# Patient Record
Sex: FEMALE | Race: BLACK OR AFRICAN AMERICAN
[De-identification: names, ages, dates, MRNs, and addresses within clinical notes are randomized per-mention and may not be internally consistent; named-entity substitution may affect disease eponyms.]

---

## 2024-07-09 ENCOUNTER — APPOINTMENT (OUTPATIENT)
Dept: UROGYNECOLOGY | Facility: CLINIC | Age: 39
End: 2024-07-09
Payer: COMMERCIAL

## 2024-07-09 VITALS
HEIGHT: 67 IN | OXYGEN SATURATION: 100 % | HEART RATE: 86 BPM | BODY MASS INDEX: 32.96 KG/M2 | TEMPERATURE: 98 F | SYSTOLIC BLOOD PRESSURE: 136 MMHG | WEIGHT: 210 LBS | DIASTOLIC BLOOD PRESSURE: 86 MMHG

## 2024-07-09 DIAGNOSIS — R35.1 NOCTURIA: ICD-10-CM

## 2024-07-09 DIAGNOSIS — R39.191 NEED TO IMMEDIATELY RE-VOID: ICD-10-CM

## 2024-07-09 DIAGNOSIS — K59.00 CONSTIPATION, UNSPECIFIED: ICD-10-CM

## 2024-07-09 DIAGNOSIS — R39.15 URGENCY OF URINATION: ICD-10-CM

## 2024-07-09 PROCEDURE — 99459 PELVIC EXAMINATION: CPT

## 2024-07-09 PROCEDURE — 51701 INSERT BLADDER CATHETER: CPT

## 2024-07-09 PROCEDURE — 99204 OFFICE O/P NEW MOD 45 MIN: CPT | Mod: 25

## 2024-07-11 LAB — BACTERIA UR CULT: NORMAL

## 2024-12-13 DIAGNOSIS — Z78.9 OTHER SPECIFIED HEALTH STATUS: ICD-10-CM

## 2024-12-13 DIAGNOSIS — Z87.59 PERSONAL HISTORY OF OTHER COMPLICATIONS OF PREGNANCY, CHILDBIRTH AND THE PUERPERIUM: ICD-10-CM

## 2024-12-13 DIAGNOSIS — Z3A.10 10 WEEKS GESTATION OF PREGNANCY: ICD-10-CM

## 2024-12-13 RX ORDER — LABETALOL HYDROCHLORIDE 200 MG/1
200 TABLET, FILM COATED ORAL 3 TIMES DAILY
Qty: 90 | Refills: 5 | Status: ACTIVE | COMMUNITY

## 2024-12-13 RX ORDER — PNV NO.95/FERROUS FUM/FOLIC AC 28MG-0.8MG
TABLET ORAL
Refills: 0 | Status: ACTIVE | COMMUNITY

## 2024-12-19 ENCOUNTER — NON-APPOINTMENT (OUTPATIENT)
Age: 39
End: 2024-12-19

## 2024-12-19 ENCOUNTER — APPOINTMENT (OUTPATIENT)
Dept: CARDIOLOGY | Facility: CLINIC | Age: 39
End: 2024-12-19
Payer: COMMERCIAL

## 2024-12-19 VITALS
OXYGEN SATURATION: 98 % | DIASTOLIC BLOOD PRESSURE: 71 MMHG | SYSTOLIC BLOOD PRESSURE: 117 MMHG | BODY MASS INDEX: 33.27 KG/M2 | TEMPERATURE: 97.5 F | HEART RATE: 92 BPM | HEIGHT: 67 IN | WEIGHT: 212 LBS

## 2024-12-19 DIAGNOSIS — I10 ESSENTIAL (PRIMARY) HYPERTENSION: ICD-10-CM

## 2024-12-19 PROCEDURE — 99204 OFFICE O/P NEW MOD 45 MIN: CPT | Mod: 25

## 2024-12-19 PROCEDURE — 93000 ELECTROCARDIOGRAM COMPLETE: CPT

## 2024-12-20 ENCOUNTER — ASOB RESULT (OUTPATIENT)
Age: 39
End: 2024-12-20

## 2024-12-20 ENCOUNTER — APPOINTMENT (OUTPATIENT)
Dept: ANTEPARTUM | Facility: CLINIC | Age: 39
End: 2024-12-20

## 2024-12-20 PROCEDURE — ZZZZZ: CPT

## 2024-12-20 PROCEDURE — 76801 OB US < 14 WKS SINGLE FETUS: CPT

## 2024-12-20 PROCEDURE — 76813 OB US NUCHAL MEAS 1 GEST: CPT

## 2024-12-27 ENCOUNTER — APPOINTMENT (OUTPATIENT)
Dept: MATERNAL FETAL MEDICINE | Facility: CLINIC | Age: 39
End: 2024-12-27

## 2024-12-27 ENCOUNTER — ASOB RESULT (OUTPATIENT)
Age: 39
End: 2024-12-27

## 2024-12-27 PROCEDURE — 99204 OFFICE O/P NEW MOD 45 MIN: CPT | Mod: 95

## 2024-12-31 ENCOUNTER — OUTPATIENT (OUTPATIENT)
Dept: OUTPATIENT SERVICES | Facility: HOSPITAL | Age: 39
LOS: 1 days | End: 2024-12-31

## 2024-12-31 VITALS
TEMPERATURE: 98 F | SYSTOLIC BLOOD PRESSURE: 128 MMHG | RESPIRATION RATE: 16 BRPM | DIASTOLIC BLOOD PRESSURE: 81 MMHG | OXYGEN SATURATION: 98 % | WEIGHT: 210.98 LBS | HEIGHT: 67 IN | HEART RATE: 85 BPM

## 2024-12-31 DIAGNOSIS — Z98.890 OTHER SPECIFIED POSTPROCEDURAL STATES: Chronic | ICD-10-CM

## 2024-12-31 DIAGNOSIS — O09.291 SUPERVISION OF PREGNANCY WITH OTHER POOR REPRODUCTIVE OR OBSTETRIC HISTORY, FIRST TRIMESTER: ICD-10-CM

## 2024-12-31 DIAGNOSIS — Z90.79 ACQUIRED ABSENCE OF OTHER GENITAL ORGAN(S): Chronic | ICD-10-CM

## 2024-12-31 LAB
APPEARANCE UR: CLEAR — SIGNIFICANT CHANGE UP
BASOPHILS # BLD AUTO: 0.08 K/UL — SIGNIFICANT CHANGE UP (ref 0–0.2)
BASOPHILS NFR BLD AUTO: 0.9 % — SIGNIFICANT CHANGE UP (ref 0–2)
BILIRUB UR-MCNC: NEGATIVE — SIGNIFICANT CHANGE UP
BLD GP AB SCN SERPL QL: NEGATIVE — SIGNIFICANT CHANGE UP
COLOR SPEC: YELLOW — SIGNIFICANT CHANGE UP
DIFF PNL FLD: ABNORMAL
EOSINOPHIL # BLD AUTO: 0.19 K/UL — SIGNIFICANT CHANGE UP (ref 0–0.5)
EOSINOPHIL NFR BLD AUTO: 2.1 % — SIGNIFICANT CHANGE UP (ref 0–6)
GLUCOSE UR QL: NEGATIVE MG/DL — SIGNIFICANT CHANGE UP
HCT VFR BLD CALC: 35.1 % — SIGNIFICANT CHANGE UP (ref 34.5–45)
HGB BLD-MCNC: 11.5 G/DL — SIGNIFICANT CHANGE UP (ref 11.5–15.5)
IMM GRANULOCYTES NFR BLD AUTO: 0.4 % — SIGNIFICANT CHANGE UP (ref 0–0.9)
KETONES UR-MCNC: NEGATIVE MG/DL — SIGNIFICANT CHANGE UP
LEUKOCYTE ESTERASE UR-ACNC: NEGATIVE — SIGNIFICANT CHANGE UP
LYMPHOCYTES # BLD AUTO: 1.5 K/UL — SIGNIFICANT CHANGE UP (ref 1–3.3)
LYMPHOCYTES # BLD AUTO: 16.7 % — SIGNIFICANT CHANGE UP (ref 13–44)
MCHC RBC-ENTMCNC: 26.2 PG — LOW (ref 27–34)
MCHC RBC-ENTMCNC: 32.8 G/DL — SIGNIFICANT CHANGE UP (ref 32–36)
MCV RBC AUTO: 80 FL — SIGNIFICANT CHANGE UP (ref 80–100)
MONOCYTES # BLD AUTO: 0.47 K/UL — SIGNIFICANT CHANGE UP (ref 0–0.9)
MONOCYTES NFR BLD AUTO: 5.2 % — SIGNIFICANT CHANGE UP (ref 2–14)
NEUTROPHILS # BLD AUTO: 6.69 K/UL — SIGNIFICANT CHANGE UP (ref 1.8–7.4)
NEUTROPHILS NFR BLD AUTO: 74.7 % — SIGNIFICANT CHANGE UP (ref 43–77)
NITRITE UR-MCNC: NEGATIVE — SIGNIFICANT CHANGE UP
PH UR: 6 — SIGNIFICANT CHANGE UP (ref 5–8)
PLATELET # BLD AUTO: 225 K/UL — SIGNIFICANT CHANGE UP (ref 150–400)
PROT UR-MCNC: NEGATIVE MG/DL — SIGNIFICANT CHANGE UP
RBC # BLD: 4.39 M/UL — SIGNIFICANT CHANGE UP (ref 3.8–5.2)
RBC # FLD: 13.4 % — SIGNIFICANT CHANGE UP (ref 10.3–14.5)
RH IG SCN BLD-IMP: POSITIVE — SIGNIFICANT CHANGE UP
RH IG SCN BLD-IMP: POSITIVE — SIGNIFICANT CHANGE UP
SP GR SPEC: 1.02 — SIGNIFICANT CHANGE UP (ref 1–1.03)
UROBILINOGEN FLD QL: 0.2 MG/DL — SIGNIFICANT CHANGE UP (ref 0.2–1)
WBC # BLD: 8.97 K/UL — SIGNIFICANT CHANGE UP (ref 3.8–10.5)
WBC # FLD AUTO: 8.97 K/UL — SIGNIFICANT CHANGE UP (ref 3.8–10.5)

## 2024-12-31 NOTE — H&P PST ADULT - PROBLEM SELECTOR PLAN 1
Patient tentatively scheduled for cerclage placement for 1/3/25. Pre-op instructions provided. Pt given verbal and written instructions with teach back. Pt instructed to follow OBGYN instructions in regards to ASA. Pt verbalized understanding with return demonstration.     PST CBC T&S ABO UA done.

## 2024-12-31 NOTE — H&P PST ADULT - HISTORY OF PRESENT ILLNESS
40 y/o female PMH ectopic pregnancy (2011) s/p salpingectomy, gestational hypertension on Labetalol and ASA, LMP 10/1/24, 13 weeks gestation presents to presurgical testing for a scheduled cerclage placement.

## 2024-12-31 NOTE — H&P PST ADULT - NSANTHOSAYNRD_GEN_A_CORE
No. DEREJE screening performed.  STOP BANG Legend: 0-2 = LOW Risk; 3-4 = INTERMEDIATE Risk; 5-8 = HIGH Risk

## 2025-01-02 NOTE — ASU PATIENT PROFILE, ADULT - FALL HARM RISK - UNIVERSAL INTERVENTIONS
Bed in lowest position, wheels locked, appropriate side rails in place/Call bell, personal items and telephone in reach/Instruct patient to call for assistance before getting out of bed or chair/Non-slip footwear when patient is out of bed/[place] to call system/Physically safe environment - no spills, clutter or unnecessary equipment/Purposeful Proactive Rounding/Room/bathroom lighting operational, light cord in reach

## 2025-01-03 ENCOUNTER — TRANSCRIPTION ENCOUNTER (OUTPATIENT)
Age: 40
End: 2025-01-03

## 2025-01-03 ENCOUNTER — OUTPATIENT (OUTPATIENT)
Dept: OUTPATIENT SERVICES | Facility: HOSPITAL | Age: 40
LOS: 1 days | Discharge: HOME | End: 2025-01-03
Payer: COMMERCIAL

## 2025-01-03 VITALS
TEMPERATURE: 99 F | RESPIRATION RATE: 20 BRPM | SYSTOLIC BLOOD PRESSURE: 139 MMHG | HEART RATE: 85 BPM | DIASTOLIC BLOOD PRESSURE: 88 MMHG | OXYGEN SATURATION: 98 %

## 2025-01-03 VITALS
WEIGHT: 210.98 LBS | RESPIRATION RATE: 16 BRPM | TEMPERATURE: 98 F | SYSTOLIC BLOOD PRESSURE: 144 MMHG | DIASTOLIC BLOOD PRESSURE: 88 MMHG | HEART RATE: 85 BPM | OXYGEN SATURATION: 99 % | HEIGHT: 67 IN

## 2025-01-03 DIAGNOSIS — Z98.890 OTHER SPECIFIED POSTPROCEDURAL STATES: Chronic | ICD-10-CM

## 2025-01-03 DIAGNOSIS — O09.291 SUPERVISION OF PREGNANCY WITH OTHER POOR REPRODUCTIVE OR OBSTETRIC HISTORY, FIRST TRIMESTER: ICD-10-CM

## 2025-01-03 DIAGNOSIS — Z90.79 ACQUIRED ABSENCE OF OTHER GENITAL ORGAN(S): Chronic | ICD-10-CM

## 2025-01-03 PROCEDURE — 59320 REVISION OF CERVIX: CPT | Mod: GC

## 2025-01-03 RX ORDER — PNV/FERROUS FUM/DOCUSATE/FOLIC 90-50-1MG
1 TABLET, EXTENDED RELEASE ORAL
Refills: 0 | DISCHARGE

## 2025-01-03 RX ORDER — LABETALOL HCL 300 MG/1
1 TABLET, FILM COATED ORAL
Refills: 0 | DISCHARGE

## 2025-01-03 RX ORDER — ASPIRIN 81 MG
2 TABLET, DELAYED RELEASE (ENTERIC COATED) ORAL
Refills: 0 | DISCHARGE

## 2025-01-03 RX ORDER — SODIUM CHLORIDE 9 MG/ML
1000 INJECTION, SOLUTION INTRAVENOUS
Refills: 0 | Status: DISCONTINUED | OUTPATIENT
Start: 2025-01-03 | End: 2025-01-03

## 2025-01-03 RX ORDER — SODIUM CHLORIDE 9 MG/ML
1000 INJECTION, SOLUTION INTRAVENOUS
Refills: 0 | Status: DISCONTINUED | OUTPATIENT
Start: 2025-01-03 | End: 2025-01-06

## 2025-01-03 NOTE — BRIEF OPERATIVE NOTE - NSICDXBRIEFPROCEDURE_GEN_ALL_CORE_FT
PROCEDURES:  Matilda cerclage of cervix during pregnancy by vaginal approach 03-Jan-2025 18:00:50  Savannah Cook

## 2025-01-03 NOTE — BRIEF OPERATIVE NOTE - NSICDXBRIEFPOSTOP_GEN_ALL_CORE_FT
POST-OP DIAGNOSIS:  Recurrent pregnancy loss in pregnant patient in second trimester, antepartum 03-Jan-2025 18:01:49  Savannah Cook

## 2025-01-03 NOTE — ASU DISCHARGE PLAN (ADULT/PEDIATRIC) - FOLLOW UP APPOINTMENTS
[facility], [facility] May also call Recovery Room (PACU) 24/7 @ [phone]/[facility]

## 2025-01-03 NOTE — ASU DISCHARGE PLAN (ADULT/PEDIATRIC) - CARE PROVIDER_API CALL
Cherrie Sullivan  Maternal/Fetal Medicine  [address] [place] [address], [address], [place] [address]  Phone: [phone]  Fax: [phone]  Follow Up Time: 1 week

## 2025-01-03 NOTE — BRIEF OPERATIVE NOTE - NSICDXBRIEFPREOP_GEN_ALL_CORE_FT
PRE-OP DIAGNOSIS:  Recurrent pregnancy loss in pregnant patient in second trimester, antepartum 03-Jan-2025 18:01:23  Savannah Cook

## 2025-01-03 NOTE — BRIEF OPERATIVE NOTE - OPERATION/FINDINGS
Cervix visually closed. Grye cerclage placed with Ticron suture with knot at 12 o'clock. Post-procedure fetal heart rate 146 bpm. Cervix visually closed. Grey cerclage placed with Ticron suture with knot at 12 o'clock. Post-procedure fetal heart rate 146 bpm.    Dictation # [id number]

## 2025-01-03 NOTE — ASU DISCHARGE PLAN (ADULT/PEDIATRIC) - FINANCIAL ASSISTANCE
[facility] provides services at a reduced cost to those who are determined to be eligible through [facility]’s financial assistance program. Information regarding [facility]’s financial assistance program can be found by going to [email or web address] or by calling [phone].

## 2025-01-30 ENCOUNTER — APPOINTMENT (OUTPATIENT)
Dept: ANTEPARTUM | Facility: CLINIC | Age: 40
End: 2025-01-30

## 2025-01-30 ENCOUNTER — ASOB RESULT (OUTPATIENT)
Age: 40
End: 2025-01-30

## 2025-01-30 PROBLEM — O00.90 UNSPECIFIED ECTOPIC PREGNANCY WITHOUT INTRAUTERINE PREGNANCY: Chronic | Status: ACTIVE | Noted: 2024-12-31

## 2025-01-30 PROBLEM — O13.9 GESTATIONAL [PREGNANCY-INDUCED] HYPERTENSION WITHOUT SIGNIFICANT PROTEINURIA, UNSPECIFIED TRIMESTER: Chronic | Status: ACTIVE | Noted: 2024-12-31

## 2025-01-30 PROCEDURE — 76815 OB US LIMITED FETUS(S): CPT

## 2025-01-30 PROCEDURE — 76817 TRANSVAGINAL US OBSTETRIC: CPT

## 2025-01-30 PROCEDURE — 99202 OFFICE O/P NEW SF 15 MIN: CPT | Mod: 25

## 2025-02-13 ENCOUNTER — NON-APPOINTMENT (OUTPATIENT)
Age: 40
End: 2025-02-13

## 2025-02-13 ENCOUNTER — APPOINTMENT (OUTPATIENT)
Dept: CARDIOLOGY | Facility: CLINIC | Age: 40
End: 2025-02-13
Payer: COMMERCIAL

## 2025-02-13 VITALS
BODY MASS INDEX: 32.96 KG/M2 | SYSTOLIC BLOOD PRESSURE: 139 MMHG | HEIGHT: 67 IN | HEART RATE: 86 BPM | DIASTOLIC BLOOD PRESSURE: 83 MMHG | OXYGEN SATURATION: 96 % | TEMPERATURE: 98.2 F | WEIGHT: 210 LBS

## 2025-02-13 VITALS — DIASTOLIC BLOOD PRESSURE: 82 MMHG | SYSTOLIC BLOOD PRESSURE: 137 MMHG

## 2025-02-13 DIAGNOSIS — I10 ESSENTIAL (PRIMARY) HYPERTENSION: ICD-10-CM

## 2025-02-13 PROCEDURE — G2211 COMPLEX E/M VISIT ADD ON: CPT | Mod: NC

## 2025-02-13 PROCEDURE — 93000 ELECTROCARDIOGRAM COMPLETE: CPT

## 2025-02-13 PROCEDURE — 99214 OFFICE O/P EST MOD 30 MIN: CPT

## 2025-02-20 ENCOUNTER — APPOINTMENT (OUTPATIENT)
Dept: ANTEPARTUM | Facility: CLINIC | Age: 40
End: 2025-02-20

## 2025-02-20 ENCOUNTER — ASOB RESULT (OUTPATIENT)
Age: 40
End: 2025-02-20

## 2025-02-20 PROCEDURE — 76817 TRANSVAGINAL US OBSTETRIC: CPT

## 2025-02-20 PROCEDURE — 76811 OB US DETAILED SNGL FETUS: CPT

## 2025-02-20 PROCEDURE — 99214 OFFICE O/P EST MOD 30 MIN: CPT | Mod: 25

## 2025-02-27 ENCOUNTER — APPOINTMENT (OUTPATIENT)
Dept: ANTEPARTUM | Facility: CLINIC | Age: 40
End: 2025-02-27

## 2025-04-03 ENCOUNTER — APPOINTMENT (OUTPATIENT)
Dept: ANTEPARTUM | Facility: CLINIC | Age: 40
End: 2025-04-03
Payer: COMMERCIAL

## 2025-04-03 ENCOUNTER — ASOB RESULT (OUTPATIENT)
Age: 40
End: 2025-04-03

## 2025-04-03 PROCEDURE — 76817 TRANSVAGINAL US OBSTETRIC: CPT

## 2025-04-03 PROCEDURE — 76816 OB US FOLLOW-UP PER FETUS: CPT

## 2025-04-24 ENCOUNTER — NON-APPOINTMENT (OUTPATIENT)
Age: 40
End: 2025-04-24

## 2025-04-24 ENCOUNTER — APPOINTMENT (OUTPATIENT)
Dept: CARDIOLOGY | Facility: CLINIC | Age: 40
End: 2025-04-24
Payer: COMMERCIAL

## 2025-04-24 ENCOUNTER — ASOB RESULT (OUTPATIENT)
Age: 40
End: 2025-04-24

## 2025-04-24 ENCOUNTER — APPOINTMENT (OUTPATIENT)
Dept: ANTEPARTUM | Facility: CLINIC | Age: 40
End: 2025-04-24
Payer: COMMERCIAL

## 2025-04-24 VITALS
HEART RATE: 88 BPM | TEMPERATURE: 97.2 F | DIASTOLIC BLOOD PRESSURE: 76 MMHG | SYSTOLIC BLOOD PRESSURE: 122 MMHG | OXYGEN SATURATION: 97 % | HEIGHT: 67 IN | WEIGHT: 216 LBS | BODY MASS INDEX: 33.9 KG/M2

## 2025-04-24 DIAGNOSIS — I10 ESSENTIAL (PRIMARY) HYPERTENSION: ICD-10-CM

## 2025-04-24 PROCEDURE — 76819 FETAL BIOPHYS PROFIL W/O NST: CPT

## 2025-04-24 PROCEDURE — G2211 COMPLEX E/M VISIT ADD ON: CPT | Mod: NC

## 2025-04-24 PROCEDURE — 76816 OB US FOLLOW-UP PER FETUS: CPT

## 2025-04-24 PROCEDURE — 99214 OFFICE O/P EST MOD 30 MIN: CPT

## 2025-04-24 PROCEDURE — 93000 ELECTROCARDIOGRAM COMPLETE: CPT

## 2025-05-15 ENCOUNTER — APPOINTMENT (OUTPATIENT)
Dept: ANTEPARTUM | Facility: CLINIC | Age: 40
End: 2025-05-15

## 2025-05-22 ENCOUNTER — APPOINTMENT (OUTPATIENT)
Dept: ANTEPARTUM | Facility: CLINIC | Age: 40
End: 2025-05-22

## 2025-05-22 ENCOUNTER — NON-APPOINTMENT (OUTPATIENT)
Age: 40
End: 2025-05-22

## 2025-05-22 ENCOUNTER — ASOB RESULT (OUTPATIENT)
Age: 40
End: 2025-05-22

## 2025-05-22 ENCOUNTER — APPOINTMENT (OUTPATIENT)
Dept: CARDIOLOGY | Facility: CLINIC | Age: 40
End: 2025-05-22
Payer: COMMERCIAL

## 2025-05-22 VITALS
HEIGHT: 67 IN | SYSTOLIC BLOOD PRESSURE: 108 MMHG | DIASTOLIC BLOOD PRESSURE: 67 MMHG | HEART RATE: 82 BPM | WEIGHT: 217 LBS | TEMPERATURE: 98.2 F | BODY MASS INDEX: 34.06 KG/M2 | OXYGEN SATURATION: 97 % | RESPIRATION RATE: 16 BRPM

## 2025-05-22 DIAGNOSIS — I10 ESSENTIAL (PRIMARY) HYPERTENSION: ICD-10-CM

## 2025-05-22 PROCEDURE — 76820 UMBILICAL ARTERY ECHO: CPT | Mod: 59

## 2025-05-22 PROCEDURE — 76818 FETAL BIOPHYS PROFILE W/NST: CPT

## 2025-05-22 PROCEDURE — 76816 OB US FOLLOW-UP PER FETUS: CPT

## 2025-05-22 PROCEDURE — 93000 ELECTROCARDIOGRAM COMPLETE: CPT

## 2025-05-22 PROCEDURE — G2211 COMPLEX E/M VISIT ADD ON: CPT | Mod: NC

## 2025-05-22 PROCEDURE — 99213 OFFICE O/P EST LOW 20 MIN: CPT | Mod: 25

## 2025-05-22 PROCEDURE — 99214 OFFICE O/P EST MOD 30 MIN: CPT

## 2025-05-23 ENCOUNTER — ASOB RESULT (OUTPATIENT)
Age: 40
End: 2025-05-23

## 2025-05-23 ENCOUNTER — APPOINTMENT (OUTPATIENT)
Dept: ANTEPARTUM | Facility: CLINIC | Age: 40
End: 2025-05-23
Payer: COMMERCIAL

## 2025-05-23 PROCEDURE — ZZZZZ: CPT

## 2025-05-29 ENCOUNTER — APPOINTMENT (OUTPATIENT)
Dept: ANTEPARTUM | Facility: CLINIC | Age: 40
End: 2025-05-29

## 2025-05-29 ENCOUNTER — ASOB RESULT (OUTPATIENT)
Age: 40
End: 2025-05-29

## 2025-05-29 PROCEDURE — 76818 FETAL BIOPHYS PROFILE W/NST: CPT

## 2025-05-29 PROCEDURE — 76820 UMBILICAL ARTERY ECHO: CPT

## 2025-06-05 ENCOUNTER — APPOINTMENT (OUTPATIENT)
Dept: ANTEPARTUM | Facility: CLINIC | Age: 40
End: 2025-06-05

## 2025-06-05 ENCOUNTER — ASOB RESULT (OUTPATIENT)
Age: 40
End: 2025-06-05

## 2025-06-05 PROCEDURE — 76820 UMBILICAL ARTERY ECHO: CPT

## 2025-06-05 PROCEDURE — 76818 FETAL BIOPHYS PROFILE W/NST: CPT

## 2025-06-12 ENCOUNTER — APPOINTMENT (OUTPATIENT)
Dept: ANTEPARTUM | Facility: CLINIC | Age: 40
End: 2025-06-12
Payer: COMMERCIAL

## 2025-06-12 ENCOUNTER — ASOB RESULT (OUTPATIENT)
Age: 40
End: 2025-06-12

## 2025-06-12 PROCEDURE — 76816 OB US FOLLOW-UP PER FETUS: CPT

## 2025-06-12 PROCEDURE — 76818 FETAL BIOPHYS PROFILE W/NST: CPT

## 2025-06-12 PROCEDURE — 76820 UMBILICAL ARTERY ECHO: CPT | Mod: 59

## 2025-06-18 ENCOUNTER — OUTPATIENT (OUTPATIENT)
Dept: OUTPATIENT SERVICES | Facility: HOSPITAL | Age: 40
LOS: 1 days | End: 2025-06-18

## 2025-06-18 VITALS
SYSTOLIC BLOOD PRESSURE: 131 MMHG | OXYGEN SATURATION: 97 % | TEMPERATURE: 99 F | WEIGHT: 220.02 LBS | HEART RATE: 81 BPM | HEIGHT: 67 IN | RESPIRATION RATE: 16 BRPM | DIASTOLIC BLOOD PRESSURE: 83 MMHG

## 2025-06-18 DIAGNOSIS — O34.29 MATERNAL CARE DUE TO UTERINE SCAR FROM OTHER PREVIOUS SURGERY: ICD-10-CM

## 2025-06-18 DIAGNOSIS — Z98.890 OTHER SPECIFIED POSTPROCEDURAL STATES: Chronic | ICD-10-CM

## 2025-06-18 DIAGNOSIS — Z90.79 ACQUIRED ABSENCE OF OTHER GENITAL ORGAN(S): Chronic | ICD-10-CM

## 2025-06-18 DIAGNOSIS — O10.919 UNSPECIFIED PRE-EXISTING HYPERTENSION COMPLICATING PREGNANCY, UNSPECIFIED TRIMESTER: ICD-10-CM

## 2025-06-18 LAB
ANION GAP SERPL CALC-SCNC: 13 MMOL/L — SIGNIFICANT CHANGE UP (ref 7–14)
APPEARANCE UR: ABNORMAL
BASOPHILS # BLD AUTO: 0.05 K/UL — SIGNIFICANT CHANGE UP (ref 0–0.2)
BASOPHILS NFR BLD AUTO: 0.6 % — SIGNIFICANT CHANGE UP (ref 0–2)
BILIRUB UR-MCNC: NEGATIVE — SIGNIFICANT CHANGE UP
BLD GP AB SCN SERPL QL: NEGATIVE — SIGNIFICANT CHANGE UP
BUN SERPL-MCNC: 6 MG/DL — LOW (ref 7–23)
CALCIUM SERPL-MCNC: 8.6 MG/DL — SIGNIFICANT CHANGE UP (ref 8.4–10.5)
CHLORIDE SERPL-SCNC: 103 MMOL/L — SIGNIFICANT CHANGE UP (ref 98–107)
CO2 SERPL-SCNC: 21 MMOL/L — LOW (ref 22–31)
COLOR SPEC: YELLOW — SIGNIFICANT CHANGE UP
CREAT SERPL-MCNC: 0.78 MG/DL — SIGNIFICANT CHANGE UP (ref 0.5–1.3)
DIFF PNL FLD: NEGATIVE — SIGNIFICANT CHANGE UP
EGFR: 98 ML/MIN/1.73M2 — SIGNIFICANT CHANGE UP
EGFR: 98 ML/MIN/1.73M2 — SIGNIFICANT CHANGE UP
EOSINOPHIL # BLD AUTO: 0.16 K/UL — SIGNIFICANT CHANGE UP (ref 0–0.5)
EOSINOPHIL NFR BLD AUTO: 1.8 % — SIGNIFICANT CHANGE UP (ref 0–6)
GLUCOSE SERPL-MCNC: 73 MG/DL — SIGNIFICANT CHANGE UP (ref 70–99)
GLUCOSE UR QL: NEGATIVE MG/DL — SIGNIFICANT CHANGE UP
HCT VFR BLD CALC: 34.3 % — LOW (ref 34.5–45)
HGB BLD-MCNC: 11.3 G/DL — LOW (ref 11.5–15.5)
IMM GRANULOCYTES NFR BLD AUTO: 0.7 % — SIGNIFICANT CHANGE UP (ref 0–0.9)
KETONES UR QL: NEGATIVE MG/DL — SIGNIFICANT CHANGE UP
LEUKOCYTE ESTERASE UR-ACNC: ABNORMAL
LYMPHOCYTES # BLD AUTO: 1.41 K/UL — SIGNIFICANT CHANGE UP (ref 1–3.3)
LYMPHOCYTES # BLD AUTO: 16.2 % — SIGNIFICANT CHANGE UP (ref 13–44)
MCHC RBC-ENTMCNC: 27 PG — SIGNIFICANT CHANGE UP (ref 27–34)
MCHC RBC-ENTMCNC: 32.9 G/DL — SIGNIFICANT CHANGE UP (ref 32–36)
MCV RBC AUTO: 81.9 FL — SIGNIFICANT CHANGE UP (ref 80–100)
MONOCYTES # BLD AUTO: 0.49 K/UL — SIGNIFICANT CHANGE UP (ref 0–0.9)
MONOCYTES NFR BLD AUTO: 5.6 % — SIGNIFICANT CHANGE UP (ref 2–14)
NEUTROPHILS # BLD AUTO: 6.51 K/UL — SIGNIFICANT CHANGE UP (ref 1.8–7.4)
NEUTROPHILS NFR BLD AUTO: 75.1 % — SIGNIFICANT CHANGE UP (ref 43–77)
NITRITE UR-MCNC: NEGATIVE — SIGNIFICANT CHANGE UP
PH UR: 7 — SIGNIFICANT CHANGE UP (ref 5–8)
PLATELET # BLD AUTO: 160 K/UL — SIGNIFICANT CHANGE UP (ref 150–400)
POTASSIUM SERPL-MCNC: 3.9 MMOL/L — SIGNIFICANT CHANGE UP (ref 3.5–5.3)
POTASSIUM SERPL-SCNC: 3.9 MMOL/L — SIGNIFICANT CHANGE UP (ref 3.5–5.3)
PROT UR-MCNC: NEGATIVE MG/DL — SIGNIFICANT CHANGE UP
RBC # BLD: 4.19 M/UL — SIGNIFICANT CHANGE UP (ref 3.8–5.2)
RBC # FLD: 13.8 % — SIGNIFICANT CHANGE UP (ref 10.3–14.5)
RH IG SCN BLD-IMP: POSITIVE — SIGNIFICANT CHANGE UP
SODIUM SERPL-SCNC: 137 MMOL/L — SIGNIFICANT CHANGE UP (ref 135–145)
SP GR SPEC: 1.01 — SIGNIFICANT CHANGE UP (ref 1–1.03)
UROBILINOGEN FLD QL: 0.2 MG/DL — SIGNIFICANT CHANGE UP (ref 0.2–1)
WBC # BLD: 8.68 K/UL — SIGNIFICANT CHANGE UP (ref 3.8–10.5)
WBC # FLD AUTO: 8.68 K/UL — SIGNIFICANT CHANGE UP (ref 3.8–10.5)

## 2025-06-18 RX ORDER — SODIUM CHLORIDE 9 G/1000ML
1000 INJECTION, SOLUTION INTRAVENOUS
Refills: 0 | Status: DISCONTINUED | OUTPATIENT
Start: 2025-06-24 | End: 2025-06-25

## 2025-06-18 NOTE — OB PST NOTE - NSICDXPASTMEDICALHX_GEN_ALL_CORE_FT
PAST MEDICAL HISTORY:  Ectopic pregnancy     Gestational hypertension     Maternal care for cervical incompetence     Supervision of elderly multigravida, unspecified trimester     Uterine fibroid

## 2025-06-18 NOTE — OB PST NOTE - HISTORY OF PRESENT ILLNESS
39 year old pregnant female presents to presurgical testing scheduled for Primary Caesarean section. Patient denies vaginal  bleeding, spotting or leakage of amniotic fluid. Patient denies regular contractions. Patient reports positive fetal movement.

## 2025-06-18 NOTE — OB PST NOTE - VISION (WITH CORRECTIVE LENSES IF THE PATIENT USUALLY WEARS THEM):
Continued Stay /CM Assessment/Plan of Care Note       Active Substitute Decision Maker (SDM)    There are no active Substitute Decision Maker (SDM) on file.           Progress note:  JERMAN order recv'd.     LSW met with pt regarding JERMAN. LSW explained PT/OT recommendations for JERMAN. Pt was understanding and agreeable. Pt [facility] [place] and wants a referral there. LSW informed her referral will be sent. She was understanding and stated no questions or concerns at this time.    PASSR completed and DON requested.    Referral sent via [facility].    SW will continue to follow the case.     See /CM flowsheets for other objective data.    Disposition Recommendations:  Preliminary discharge destination:    / recommendation for discharge:      Discharge Plan/Needs:     Continued Care and Services - Admitted Since 8/10/2023    Coordination has not been started for this encounter.           Devices/ Equipment that need to be arranged for discharge:     Accepted   Pending insurance authorization   Others:    Anticipated date of DME availability:     Prior To Hospitalization:    Living Situation: Significant other and residing at [facility]    .  Support Systems: Significant other, Children   Home Devices/Equipment: Blood glucose monitor, Mobility assist device ,   ,    ,      Mobility Assist Devices: Front-wheeled walker   Type of Service Prior to Hospitalization: None ,   ,   ,   ,    ,       Patient/Family discharge goal (s):        Resources provided:           Therapy Recommendations for Discharge:   PT:      Last Filed Values       Value Time User    PT Discharge Needs  therapy 5 or more times per week 8/11/2023  2:56 PM Josie Stanford, SUGAR        OT:       Last Filed Values     None        SLP:    Last Filed Values     None          Mobility Equipment Recommended for Discharge: TBD      Barriers to Discharge  Identified Barriers to Discharge/Transition Planning:                   Normal vision: sees adequately in most situations; can see medication labels, newsprint

## 2025-06-18 NOTE — OB PST NOTE - NSICDXPASTSURGICALHX_GEN_ALL_CORE_FT
PAST SURGICAL HISTORY:  H/O myomectomy     H/O unilateral salpingectomy     History of cervical cerclage     History of salpingectomy

## 2025-06-18 NOTE — OB PST NOTE - PROBLEM SELECTOR PLAN 1
Patient tentatively scheduled for Primary .  Pre-op instructions provided. Pt given verbal and written instructions with teach back on chlorhexidine shampoo . Pt verbalized understanding with return demonstration.   CBC/Anemia, BMP, Type and screen and Ua were done at PST.  Pt instructed to follow up with OBGYN for any further medication instructions.

## 2025-06-19 ENCOUNTER — APPOINTMENT (OUTPATIENT)
Dept: ANTEPARTUM | Facility: CLINIC | Age: 40
End: 2025-06-19
Payer: COMMERCIAL

## 2025-06-19 ENCOUNTER — ASOB RESULT (OUTPATIENT)
Age: 40
End: 2025-06-19

## 2025-06-19 PROCEDURE — 76820 UMBILICAL ARTERY ECHO: CPT

## 2025-06-19 PROCEDURE — 76818 FETAL BIOPHYS PROFILE W/NST: CPT

## 2025-06-24 ENCOUNTER — TRANSCRIPTION ENCOUNTER (OUTPATIENT)
Age: 40
End: 2025-06-24

## 2025-06-24 ENCOUNTER — INPATIENT (INPATIENT)
Facility: HOSPITAL | Age: 40
LOS: 2 days | Discharge: HOME | End: 2025-06-27
Attending: OBSTETRICS & GYNECOLOGY | Admitting: OBSTETRICS & GYNECOLOGY
Payer: COMMERCIAL

## 2025-06-24 VITALS — SYSTOLIC BLOOD PRESSURE: 139 MMHG | HEART RATE: 87 BPM | DIASTOLIC BLOOD PRESSURE: 82 MMHG

## 2025-06-24 DIAGNOSIS — Z98.890 OTHER SPECIFIED POSTPROCEDURAL STATES: Chronic | ICD-10-CM

## 2025-06-24 DIAGNOSIS — Z90.79 ACQUIRED ABSENCE OF OTHER GENITAL ORGAN(S): Chronic | ICD-10-CM

## 2025-06-24 DIAGNOSIS — O10.919 UNSPECIFIED PRE-EXISTING HYPERTENSION COMPLICATING PREGNANCY, UNSPECIFIED TRIMESTER: ICD-10-CM

## 2025-06-24 LAB
ALBUMIN SERPL ELPH-MCNC: 3.3 G/DL — SIGNIFICANT CHANGE UP (ref 3.3–5)
ALBUMIN SERPL ELPH-MCNC: 3.6 G/DL — SIGNIFICANT CHANGE UP (ref 3.3–5)
ALP SERPL-CCNC: 191 U/L — HIGH (ref 40–120)
ALP SERPL-CCNC: 198 U/L — HIGH (ref 40–120)
ALT FLD-CCNC: 20 U/L — SIGNIFICANT CHANGE UP (ref 4–33)
ALT FLD-CCNC: 20 U/L — SIGNIFICANT CHANGE UP (ref 4–33)
ANION GAP SERPL CALC-SCNC: 12 MMOL/L — SIGNIFICANT CHANGE UP (ref 7–14)
ANION GAP SERPL CALC-SCNC: 9 MMOL/L — SIGNIFICANT CHANGE UP (ref 7–14)
APPEARANCE UR: CLEAR — SIGNIFICANT CHANGE UP
AST SERPL-CCNC: 28 U/L — SIGNIFICANT CHANGE UP (ref 4–32)
AST SERPL-CCNC: 30 U/L — SIGNIFICANT CHANGE UP (ref 4–32)
BACTERIA # UR AUTO: NEGATIVE /HPF — SIGNIFICANT CHANGE UP
BASOPHILS # BLD AUTO: 0.04 K/UL — SIGNIFICANT CHANGE UP (ref 0–0.2)
BASOPHILS # BLD AUTO: 0.05 K/UL — SIGNIFICANT CHANGE UP (ref 0–0.2)
BASOPHILS NFR BLD AUTO: 0.3 % — SIGNIFICANT CHANGE UP (ref 0–2)
BASOPHILS NFR BLD AUTO: 0.5 % — SIGNIFICANT CHANGE UP (ref 0–2)
BILIRUB SERPL-MCNC: 0.4 MG/DL — SIGNIFICANT CHANGE UP (ref 0.2–1.2)
BILIRUB SERPL-MCNC: 0.4 MG/DL — SIGNIFICANT CHANGE UP (ref 0.2–1.2)
BILIRUB UR-MCNC: NEGATIVE — SIGNIFICANT CHANGE UP
BLD GP AB SCN SERPL QL: NEGATIVE — SIGNIFICANT CHANGE UP
BUN SERPL-MCNC: 7 MG/DL — SIGNIFICANT CHANGE UP (ref 7–23)
BUN SERPL-MCNC: 8 MG/DL — SIGNIFICANT CHANGE UP (ref 7–23)
CALCIUM SERPL-MCNC: 9.2 MG/DL — SIGNIFICANT CHANGE UP (ref 8.4–10.5)
CALCIUM SERPL-MCNC: 9.3 MG/DL — SIGNIFICANT CHANGE UP (ref 8.4–10.5)
CAST: 1 /LPF — SIGNIFICANT CHANGE UP (ref 0–4)
CHLORIDE SERPL-SCNC: 102 MMOL/L — SIGNIFICANT CHANGE UP (ref 98–107)
CHLORIDE SERPL-SCNC: 104 MMOL/L — SIGNIFICANT CHANGE UP (ref 98–107)
CO2 SERPL-SCNC: 22 MMOL/L — SIGNIFICANT CHANGE UP (ref 22–31)
CO2 SERPL-SCNC: 22 MMOL/L — SIGNIFICANT CHANGE UP (ref 22–31)
COLOR SPEC: YELLOW — SIGNIFICANT CHANGE UP
CREAT ?TM UR-MCNC: 138 MG/DL — SIGNIFICANT CHANGE UP
CREAT SERPL-MCNC: 0.77 MG/DL — SIGNIFICANT CHANGE UP (ref 0.5–1.3)
CREAT SERPL-MCNC: 0.84 MG/DL — SIGNIFICANT CHANGE UP (ref 0.5–1.3)
DIFF PNL FLD: ABNORMAL
EGFR: 100 ML/MIN/1.73M2 — SIGNIFICANT CHANGE UP
EGFR: 100 ML/MIN/1.73M2 — SIGNIFICANT CHANGE UP
EGFR: 90 ML/MIN/1.73M2 — SIGNIFICANT CHANGE UP
EGFR: 90 ML/MIN/1.73M2 — SIGNIFICANT CHANGE UP
EOSINOPHIL # BLD AUTO: 0.03 K/UL — SIGNIFICANT CHANGE UP (ref 0–0.5)
EOSINOPHIL # BLD AUTO: 0.13 K/UL — SIGNIFICANT CHANGE UP (ref 0–0.5)
EOSINOPHIL NFR BLD AUTO: 0.2 % — SIGNIFICANT CHANGE UP (ref 0–6)
EOSINOPHIL NFR BLD AUTO: 1.3 % — SIGNIFICANT CHANGE UP (ref 0–6)
GLUCOSE SERPL-MCNC: 78 MG/DL — SIGNIFICANT CHANGE UP (ref 70–99)
GLUCOSE SERPL-MCNC: 88 MG/DL — SIGNIFICANT CHANGE UP (ref 70–99)
GLUCOSE UR QL: NEGATIVE MG/DL — SIGNIFICANT CHANGE UP
HCT VFR BLD CALC: 35.9 % — SIGNIFICANT CHANGE UP (ref 34.5–45)
HCT VFR BLD CALC: 37.3 % — SIGNIFICANT CHANGE UP (ref 34.5–45)
HGB BLD-MCNC: 11.6 G/DL — SIGNIFICANT CHANGE UP (ref 11.5–15.5)
HGB BLD-MCNC: 11.9 G/DL — SIGNIFICANT CHANGE UP (ref 11.5–15.5)
IMM GRANULOCYTES # BLD AUTO: 0.07 K/UL — SIGNIFICANT CHANGE UP (ref 0–0.07)
IMM GRANULOCYTES # BLD AUTO: 0.08 K/UL — HIGH (ref 0–0.07)
IMM GRANULOCYTES NFR BLD AUTO: 0.6 % — SIGNIFICANT CHANGE UP (ref 0–0.9)
IMM GRANULOCYTES NFR BLD AUTO: 0.8 % — SIGNIFICANT CHANGE UP (ref 0–0.9)
KETONES UR QL: 15 MG/DL
LDH SERPL L TO P-CCNC: 179 U/L — SIGNIFICANT CHANGE UP (ref 135–225)
LDH SERPL L TO P-CCNC: 232 U/L — HIGH (ref 135–225)
LEUKOCYTE ESTERASE UR-ACNC: NEGATIVE — SIGNIFICANT CHANGE UP
LYMPHOCYTES # BLD AUTO: 0.84 K/UL — LOW (ref 1–3.3)
LYMPHOCYTES # BLD AUTO: 1.47 K/UL — SIGNIFICANT CHANGE UP (ref 1–3.3)
LYMPHOCYTES NFR BLD AUTO: 14.3 % — SIGNIFICANT CHANGE UP (ref 13–44)
LYMPHOCYTES NFR BLD AUTO: 6.8 % — LOW (ref 13–44)
MAGNESIUM SERPL-MCNC: 3.9 MG/DL — HIGH (ref 1.6–2.6)
MCHC RBC-ENTMCNC: 26.4 PG — LOW (ref 27–34)
MCHC RBC-ENTMCNC: 26.5 PG — LOW (ref 27–34)
MCHC RBC-ENTMCNC: 31.9 G/DL — LOW (ref 32–36)
MCHC RBC-ENTMCNC: 32.3 G/DL — SIGNIFICANT CHANGE UP (ref 32–36)
MCV RBC AUTO: 82 FL — SIGNIFICANT CHANGE UP (ref 80–100)
MCV RBC AUTO: 82.9 FL — SIGNIFICANT CHANGE UP (ref 80–100)
MONOCYTES # BLD AUTO: 0.38 K/UL — SIGNIFICANT CHANGE UP (ref 0–0.9)
MONOCYTES # BLD AUTO: 0.67 K/UL — SIGNIFICANT CHANGE UP (ref 0–0.9)
MONOCYTES NFR BLD AUTO: 3.1 % — SIGNIFICANT CHANGE UP (ref 2–14)
MONOCYTES NFR BLD AUTO: 6.5 % — SIGNIFICANT CHANGE UP (ref 2–14)
NEUTROPHILS # BLD AUTO: 10.97 K/UL — HIGH (ref 1.8–7.4)
NEUTROPHILS # BLD AUTO: 7.9 K/UL — HIGH (ref 1.8–7.4)
NEUTROPHILS NFR BLD AUTO: 76.6 % — SIGNIFICANT CHANGE UP (ref 43–77)
NEUTROPHILS NFR BLD AUTO: 89 % — HIGH (ref 43–77)
NITRITE UR-MCNC: NEGATIVE — SIGNIFICANT CHANGE UP
NRBC # BLD AUTO: 0 K/UL — SIGNIFICANT CHANGE UP (ref 0–0)
NRBC # BLD AUTO: 0 K/UL — SIGNIFICANT CHANGE UP (ref 0–0)
NRBC # FLD: 0 K/UL — SIGNIFICANT CHANGE UP (ref 0–0)
NRBC # FLD: 0 K/UL — SIGNIFICANT CHANGE UP (ref 0–0)
NRBC BLD AUTO-RTO: 0 /100 WBCS — SIGNIFICANT CHANGE UP (ref 0–0)
NRBC BLD AUTO-RTO: 0 /100 WBCS — SIGNIFICANT CHANGE UP (ref 0–0)
PH UR: 6 — SIGNIFICANT CHANGE UP (ref 5–8)
PLATELET # BLD AUTO: 163 K/UL — SIGNIFICANT CHANGE UP (ref 150–400)
PLATELET # BLD AUTO: 163 K/UL — SIGNIFICANT CHANGE UP (ref 150–400)
PMV BLD: 11.9 FL — SIGNIFICANT CHANGE UP (ref 7–13)
PMV BLD: 12.2 FL — SIGNIFICANT CHANGE UP (ref 7–13)
POTASSIUM SERPL-MCNC: 3.8 MMOL/L — SIGNIFICANT CHANGE UP (ref 3.5–5.3)
POTASSIUM SERPL-MCNC: 4.2 MMOL/L — SIGNIFICANT CHANGE UP (ref 3.5–5.3)
POTASSIUM SERPL-SCNC: 3.8 MMOL/L — SIGNIFICANT CHANGE UP (ref 3.5–5.3)
POTASSIUM SERPL-SCNC: 4.2 MMOL/L — SIGNIFICANT CHANGE UP (ref 3.5–5.3)
PROT ?TM UR-MCNC: 19 MG/DL — SIGNIFICANT CHANGE UP
PROT SERPL-MCNC: 6.4 G/DL — SIGNIFICANT CHANGE UP (ref 6–8.3)
PROT SERPL-MCNC: 6.6 G/DL — SIGNIFICANT CHANGE UP (ref 6–8.3)
PROT UR-MCNC: SIGNIFICANT CHANGE UP MG/DL
PROT/CREAT UR-RTO: 0.1 RATIO — SIGNIFICANT CHANGE UP (ref 0–0.2)
RBC # BLD: 4.38 M/UL — SIGNIFICANT CHANGE UP (ref 3.8–5.2)
RBC # BLD: 4.5 M/UL — SIGNIFICANT CHANGE UP (ref 3.8–5.2)
RBC # FLD: 13.6 % — SIGNIFICANT CHANGE UP (ref 10.3–14.5)
RBC # FLD: 13.8 % — SIGNIFICANT CHANGE UP (ref 10.3–14.5)
RBC CASTS # UR COMP ASSIST: 408 /HPF — HIGH (ref 0–4)
REVIEW: SIGNIFICANT CHANGE UP
RH IG SCN BLD-IMP: POSITIVE — SIGNIFICANT CHANGE UP
SODIUM SERPL-SCNC: 135 MMOL/L — SIGNIFICANT CHANGE UP (ref 135–145)
SODIUM SERPL-SCNC: 136 MMOL/L — SIGNIFICANT CHANGE UP (ref 135–145)
SP GR SPEC: 1.02 — SIGNIFICANT CHANGE UP (ref 1–1.03)
SQUAMOUS # UR AUTO: 1 /HPF — SIGNIFICANT CHANGE UP (ref 0–5)
T PALLIDUM AB TITR SER: NEGATIVE — SIGNIFICANT CHANGE UP
URATE SERPL-MCNC: 6.2 MG/DL — SIGNIFICANT CHANGE UP (ref 2.5–7)
URATE SERPL-MCNC: 6.9 MG/DL — SIGNIFICANT CHANGE UP (ref 2.5–7)
UROBILINOGEN FLD QL: 0.2 MG/DL — SIGNIFICANT CHANGE UP (ref 0.2–1)
WBC # BLD: 10.3 K/UL — SIGNIFICANT CHANGE UP (ref 3.8–10.5)
WBC # BLD: 12.33 K/UL — HIGH (ref 3.8–10.5)
WBC # FLD AUTO: 10.3 K/UL — SIGNIFICANT CHANGE UP (ref 3.8–10.5)
WBC # FLD AUTO: 12.33 K/UL — HIGH (ref 3.8–10.5)
WBC UR QL: 3 /HPF — SIGNIFICANT CHANGE UP (ref 0–5)

## 2025-06-24 PROCEDURE — 93010 ELECTROCARDIOGRAM REPORT: CPT

## 2025-06-24 RX ORDER — LABETALOL HYDROCHLORIDE 200 MG/1
300 TABLET, FILM COATED ORAL EVERY 8 HOURS
Refills: 0 | Status: DISCONTINUED | OUTPATIENT
Start: 2025-06-24 | End: 2025-06-24

## 2025-06-24 RX ORDER — SODIUM CHLORIDE 9 G/1000ML
1000 INJECTION, SOLUTION INTRAVENOUS
Refills: 0 | Status: DISCONTINUED | OUTPATIENT
Start: 2025-06-24 | End: 2025-06-25

## 2025-06-24 RX ORDER — ONDANSETRON HCL/PF 4 MG/2 ML
4 VIAL (ML) INJECTION EVERY 6 HOURS
Refills: 0 | Status: DISCONTINUED | OUTPATIENT
Start: 2025-06-24 | End: 2025-06-25

## 2025-06-24 RX ORDER — LABETALOL HYDROCHLORIDE 200 MG/1
300 TABLET, FILM COATED ORAL EVERY 8 HOURS
Refills: 0 | Status: DISCONTINUED | OUTPATIENT
Start: 2025-06-24 | End: 2025-06-27

## 2025-06-24 RX ORDER — MODIFIED LANOLIN 100 %
1 CREAM (GRAM) TOPICAL EVERY 6 HOURS
Refills: 0 | Status: DISCONTINUED | OUTPATIENT
Start: 2025-06-24 | End: 2025-06-27

## 2025-06-24 RX ORDER — OXYCODONE HYDROCHLORIDE 30 MG/1
5 TABLET ORAL ONCE
Refills: 0 | Status: DISCONTINUED | OUTPATIENT
Start: 2025-06-24 | End: 2025-06-27

## 2025-06-24 RX ORDER — MAGNESIUM HYDROXIDE 400 MG/5ML
30 SUSPENSION ORAL
Refills: 0 | Status: DISCONTINUED | OUTPATIENT
Start: 2025-06-24 | End: 2025-06-27

## 2025-06-24 RX ORDER — IBUPROFEN 200 MG
600 TABLET ORAL EVERY 6 HOURS
Refills: 0 | Status: COMPLETED | OUTPATIENT
Start: 2025-06-24 | End: 2026-05-23

## 2025-06-24 RX ORDER — ASPIRIN 325 MG
2 TABLET ORAL
Refills: 0 | DISCHARGE

## 2025-06-24 RX ORDER — MAGNESIUM SULFATE 500 MG/ML
2 SYRINGE (ML) INJECTION
Qty: 40 | Refills: 0 | Status: DISCONTINUED | OUTPATIENT
Start: 2025-06-24 | End: 2025-06-25

## 2025-06-24 RX ORDER — HEPARIN SODIUM 1000 [USP'U]/ML
5000 INJECTION INTRAVENOUS; SUBCUTANEOUS EVERY 12 HOURS
Refills: 0 | Status: DISCONTINUED | OUTPATIENT
Start: 2025-06-24 | End: 2025-06-27

## 2025-06-24 RX ORDER — MAGNESIUM SULFATE 500 MG/ML
4 SYRINGE (ML) INJECTION ONCE
Refills: 0 | Status: COMPLETED | OUTPATIENT
Start: 2025-06-24 | End: 2025-06-24

## 2025-06-24 RX ORDER — SIMETHICONE 80 MG
80 TABLET,CHEWABLE ORAL EVERY 4 HOURS
Refills: 0 | Status: DISCONTINUED | OUTPATIENT
Start: 2025-06-24 | End: 2025-06-27

## 2025-06-24 RX ORDER — DIPHENHYDRAMINE HCL 12.5MG/5ML
25 ELIXIR ORAL EVERY 6 HOURS
Refills: 0 | Status: DISCONTINUED | OUTPATIENT
Start: 2025-06-24 | End: 2025-06-27

## 2025-06-24 RX ORDER — ACETAMINOPHEN 500 MG/5ML
975 LIQUID (ML) ORAL
Refills: 0 | Status: DISCONTINUED | OUTPATIENT
Start: 2025-06-24 | End: 2025-06-27

## 2025-06-24 RX ORDER — KETOROLAC TROMETHAMINE 30 MG/ML
30 INJECTION, SOLUTION INTRAMUSCULAR; INTRAVENOUS EVERY 6 HOURS
Refills: 0 | Status: DISCONTINUED | OUTPATIENT
Start: 2025-06-24 | End: 2025-06-25

## 2025-06-24 RX ORDER — OXYCODONE HYDROCHLORIDE 30 MG/1
5 TABLET ORAL
Refills: 0 | Status: DISCONTINUED | OUTPATIENT
Start: 2025-06-24 | End: 2025-06-24

## 2025-06-24 RX ORDER — OXYTOCIN-SODIUM CHLORIDE 0.9% IV SOLN 30 UNIT/500ML 30-0.9/5 UT/ML-%
42 SOLUTION INTRAVENOUS
Qty: 30 | Refills: 0 | Status: DISCONTINUED | OUTPATIENT
Start: 2025-06-24 | End: 2025-06-25

## 2025-06-24 RX ORDER — OXYCODONE HYDROCHLORIDE 30 MG/1
5 TABLET ORAL
Refills: 0 | Status: DISCONTINUED | OUTPATIENT
Start: 2025-06-24 | End: 2025-06-27

## 2025-06-24 RX ORDER — NIFEDIPINE 30 MG
30 TABLET, EXTENDED RELEASE 24 HR ORAL DAILY
Refills: 0 | Status: DISCONTINUED | OUTPATIENT
Start: 2025-06-24 | End: 2025-06-27

## 2025-06-24 RX ORDER — NALBUPHINE HYDROCHLORIDE 10 MG/ML
2.5 INJECTION INTRAMUSCULAR; INTRAVENOUS; SUBCUTANEOUS EVERY 6 HOURS
Refills: 0 | Status: DISCONTINUED | OUTPATIENT
Start: 2025-06-24 | End: 2025-06-25

## 2025-06-24 RX ORDER — NIFEDIPINE 30 MG
10 TABLET, EXTENDED RELEASE 24 HR ORAL ONCE
Refills: 0 | Status: COMPLETED | OUTPATIENT
Start: 2025-06-24 | End: 2025-06-24

## 2025-06-24 RX ORDER — CITRIC ACID/SODIUM CITRATE 300-500 MG
30 SOLUTION, ORAL ORAL ONCE
Refills: 0 | Status: COMPLETED | OUTPATIENT
Start: 2025-06-24 | End: 2025-06-24

## 2025-06-24 RX ORDER — DEXAMETHASONE 0.5 MG/1
4 TABLET ORAL EVERY 6 HOURS
Refills: 0 | Status: DISCONTINUED | OUTPATIENT
Start: 2025-06-24 | End: 2025-06-25

## 2025-06-24 RX ORDER — DIPHENHYDRAMINE HCL 12.5MG/5ML
25 ELIXIR ORAL EVERY 4 HOURS
Refills: 0 | Status: DISCONTINUED | OUTPATIENT
Start: 2025-06-24 | End: 2025-06-25

## 2025-06-24 RX ADMIN — Medication 30 MILLILITER(S): at 10:36

## 2025-06-24 RX ADMIN — SODIUM CHLORIDE 200 MILLILITER(S): 9 INJECTION, SOLUTION INTRAVENOUS at 10:36

## 2025-06-24 RX ADMIN — LABETALOL HYDROCHLORIDE 300 MILLIGRAM(S): 200 TABLET, FILM COATED ORAL at 22:23

## 2025-06-24 RX ADMIN — Medication 30 MILLIGRAM(S): at 15:02

## 2025-06-24 RX ADMIN — Medication 300 GRAM(S): at 15:31

## 2025-06-24 RX ADMIN — Medication 50 GM/HR: at 15:52

## 2025-06-24 RX ADMIN — Medication 1 APPLICATION(S): at 10:36

## 2025-06-24 RX ADMIN — Medication 50 GM/HR: at 19:07

## 2025-06-24 RX ADMIN — Medication 10 MILLIGRAM(S): at 15:02

## 2025-06-24 RX ADMIN — Medication 20 MILLIGRAM(S): at 10:36

## 2025-06-24 NOTE — CHART NOTE - NSCHARTNOTEFT_GEN_A_CORE
OB/GYN PA NOTE    Patient seen and examined at bedside with persistent 150/100s BPs. Patient states she is feeling well. Patient has not yet attempted PO hydration. Patient states pain well controlled on current pain regimen. Patient denies lightheadedness, dizziness, shortness of breath, chest pain, fever/chills, rash, palpitations    Vital Signs: ICU Vital Signs Last 24 Hrs  HR: 59 bpm  BP: 165/99  SpO2: 99% on room air  Gen: NAD, A+O x 3, resting comfortably in bed  Resp: clear to auscultation bilaterally  Cardio: regular rate and rhythm   Dressing: Clean/Dry/Intact   Abd: Soft, non-distended, non-tender to palpation in all 4 quadrants, no rebound/guarding  Fundus: Firm, midline, 1 below, appropriately tender to palpation  Lochia: Minimal  Ext: Warm, well perfused bilaterally, SCDs bilaterally     UOP: adequate, appears clear    Labs:                        11.6   10.30 )-----------( 163      ( 2025 09:15 )             35.9                         11.3   8.68  )-----------( 160      ( 2025 10:14 )             34.3   06-24    136  |  102  |  8   ----------------------------<  78  3.8   |  22  |  0.84    Ca    9.3      2025 09:40    TPro  6.6  /  Alb  3.6  /  TBili  0.4  /  DBili  x   /  AST  28  /  ALT  20  /  AlkPhos  198[H]        A&P: 39 year old POD 0 s/p pLTCS+cerclage removal with history of myomectomy, with  cc, uncomplicated  with cHTN with labs within normal limits, asymptomatic, with persistent 150/100s with all other vital signs normal  - Current antihypertensive regimen: Labetalol 300 mg TID, currently due to medication at 1530, currently bradycardic with HR <60 bpm while sleeping in PACU, defer dose at this time. Instead, administer Procardia 10 mg IR now as well as Procardia 30 mg XL  - Closely monitor for signs/symptoms of pre-eclampsia with severe features  - Send UA with P/C   - Continue routine postpartum and post-operative care  Discussed with  OB/GYN PA NOTE    Patient seen and examined at bedside with persistent 150/100s BPs. Patient states she is feeling well. Patient has not yet attempted PO hydration. Patient states pain well controlled on current pain regimen. Patient denies lightheadedness, dizziness, shortness of breath, chest pain, fever/chills, rash, palpitations    Vital Signs: ICU Vital Signs Last 24 Hrs  HR: 59 bpm  BP: 165/99  SpO2: 99% on room air  Gen: NAD, A+O x 3, resting comfortably in bed  Resp: clear to auscultation bilaterally  Cardio: regular rate and rhythm   Dressing: Clean/Dry/Intact   Abd: Soft, non-distended, non-tender to palpation in all 4 quadrants, no rebound/guarding  Fundus: Firm, midline, 1 below, appropriately tender to palpation  Lochia: Minimal  Ext: Warm, well perfused bilaterally, SCDs bilaterally    Repeat BP: 168/95     UOP: adequate, appears clear    Labs:                        11.6   10.30 )-----------( 163      ( 2025 09:15 )             35.9                         11.3   8.68  )-----------( 160      ( 2025 10:14 )             34.3   06-24    136  |  102  |  8   ----------------------------<  78  3.8   |  22  |  0.84    Ca    9.3      2025 09:40    TPro  6.6  /  Alb  3.6  /  TBili  0.4  /  DBili  x   /  AST  28  /  ALT  20  /  AlkPhos  198[H]        A&P: 39 year old POD 0 s/p pLTCS+cerclage removal with history of myomectomy, with  cc, uncomplicated  with new diagnosis of super imposed pre-eclampsia with severe features, asymptomatic, all other vital signs stable  - Administer Procardia 10 mg IR and Procardia 30 mg XL   - Initiate Magnesium for seizure prophylaxis, discussed mag levels and the signs and symptoms to monitor for regarding mag toxicity   - Closely monitor BPs and signs and symptoms for severe features  - Send HELLP labs  - Strict I&Os  - Educated and discussed with patients the diagnosis and the concern regarding pre-eclampsia with severe features, discussed the importance of taking BPs within these next 6 weeks postpartum, and educated on importance of notifying OB/GYN for signs and symptoms including but not limited to headache, vision changes, shortness of breath, chest pain, severe abdominal pain especially in right upper quadrant and epigastrium pain, swelling  - Send UA with P/C   - Continue routine postpartum and post-operative care  Discussed with Dr. Ram OB/GYN PA NOTE    Patient seen and examined at bedside with persistent 150/100s BPs. Patient states she is feeling well. Patient has not yet attempted PO hydration. Patient states pain well controlled on current pain regimen. Patient denies lightheadedness, dizziness, shortness of breath, chest pain, fever/chills, rash, palpitations    Vital Signs: ICU Vital Signs Last 24 Hrs  HR: 59 bpm  BP: 165/99  SpO2: 99% on room air  Gen: NAD, A+O x 3, resting comfortably in bed  Resp: clear to auscultation bilaterally  Cardio: regular rate and rhythm   Dressing: Clean/Dry/Intact   Abd: Soft, non-distended, non-tender to palpation in all 4 quadrants, no rebound/guarding  Fundus: Firm, midline, 1 below, appropriately tender to palpation  Lochia: Minimal  Ext: Warm, well perfused bilaterally, SCDs bilaterally    Repeat BP: 168/95     UOP: adequate, appears clear    Labs:                        11.6   10.30 )-----------( 163      ( 2025 09:15 )             35.9                         11.3   8.68  )-----------( 160      ( 2025 10:14 )             34.3   06-24    136  |  102  |  8   ----------------------------<  78  3.8   |  22  |  0.84    Ca    9.3      2025 09:40    TPro  6.6  /  Alb  3.6  /  TBili  0.4  /  DBili  x   /  AST  28  /  ALT  20  /  AlkPhos  198[H]        A&P: 39 year old POD 0 s/p pLTCS+cerclage removal with history of myomectomy, with  cc, uncomplicated  with new diagnosis of super imposed pre-eclampsia with severe features, asymptomatic, all other vital signs stable  - Administer Procardia 10 mg IR and Procardia 30 mg XL   - Initiate Magnesium for seizure prophylaxis, discussed mag levels and the signs and symptoms to monitor for regarding mag toxicity   - Closely monitor BPs and signs and symptoms for severe features  - Send HELLP labs  - Strict I&Os  - Educated and discussed with patients the diagnosis and the concern regarding pre-eclampsia with severe features, discussed the importance of taking BPs within these next 6 weeks postpartum, and educated on importance of notifying OB/GYN for signs and symptoms including but not limited to headache, vision changes, shortness of breath, chest pain, severe abdominal pain especially in right upper quadrant and epigastrium pain, swelling  - Send UA with P/C   - Continue routine postpartum and post-operative care  Discussed with Dr. Leanna SANCHEZ ADDENDUM   15:20 133/98 HR 66 bpm

## 2025-06-24 NOTE — OB RN DELIVERY SUMMARY - NS_CORDVESSELS_OBGYN_ALL_OB
3 Complex Repair And Split-Thickness Skin Graft Text: The defect edges were debeveled with a #15 scalpel blade.  The primary defect was closed partially with a complex linear closure.  Given the location of the defect, shape of the defect and the proximity to free margins a split thickness skin graft was deemed most appropriate to repair the remaining defect.  The graft was trimmed to fit the size of the remaining defect.  The graft was then placed in the primary defect, oriented appropriately, and sutured into place.

## 2025-06-24 NOTE — DISCHARGE NOTE OB - MEDICATION SUMMARY - MEDICATIONS TO TAKE
I will START or STAY ON the medications listed below when I get home from the hospital:    labetalol 300 mg oral tablet  -- 1 tab(s) by mouth 3 times a day  -- Indication: For HTN    Colace 100 mg oral capsule  -- 2 cap(s) by mouth once a day  -- Indication: For Stool softener   I will START or STAY ON the medications listed below when I get home from the hospital:    ibuprofen 600 mg oral tablet  -- 1 tab(s) by mouth every 6 hours  -- Indication: For Pain     acetaminophen 325 mg oral tablet  -- 3 tab(s) by mouth every 6 hours as needed for  moderate pain  -- Indication: For Pain    labetalol 300 mg oral tablet  -- 1 tab(s) by mouth 3 times a day  -- Indication: For Preeclampsia (HTN)    NIFEdipine 30 mg oral tablet, extended release  -- 1 tab(s) by mouth once a day  -- Indication: For Preeclampsia     Colace 100 mg oral capsule  -- 2 cap(s) by mouth once a day  -- Indication: For Stool softener   I will START or STAY ON the medications listed below when I get home from the hospital:    ibuprofen 600 mg oral tablet  -- 1 tab(s) by mouth every 6 hours  -- Indication: For Pain     acetaminophen 325 mg oral tablet  -- 3 tab(s) by mouth every 6 hours as needed for  moderate pain  -- Indication: For Pain    labetalol 300 mg oral tablet  -- 1 tab(s) by mouth 3 times a day  -- Indication: For Preeclampsia (HTN)    NIFEdipine 30 mg oral tablet, extended release  -- 1 tab(s) by mouth once a day  -- Indication: For Preeclampsia     Prenatal Multivitamins with Folic Acid 1 mg oral tablet  -- 1 tab(s) by mouth once a day  -- Indication: For Postpartum    ferrous sulfate 325 mg (65 mg elemental iron) oral tablet  -- 1 tab(s) by mouth once a day  -- Indication: For anemia     Colace 100 mg oral capsule  -- 2 cap(s) by mouth once a day  -- Indication: For Stool softener    ascorbic acid 500 mg oral tablet  -- 1 tab(s) by mouth once a day  -- Indication: For nutrition

## 2025-06-24 NOTE — OB RN DELIVERY SUMMARY - NSSELHIDDEN_OBGYN_ALL_OB_FT
[[email or web address]_OB_FT:[id number]],[NS_DeliveryAssist1_OBGYN_ALL_OB_FT:[id number]],[NS_DeliveryRN_OBGYN_ALL_OB_FT:[id number]]

## 2025-06-24 NOTE — LACTATION INITIAL EVALUATION - LACTATION INTERVENTIONS
Went over New beginign book. Made aware of cluster feeding of life and to be cautious of sleep deprivation in order to maintain infant and mother safety. Instructed to place infant in bassinet or call for assistance if feeling sleepy or tired.  Mother and Infant roomed-in.   Mother educated on safe skin to skin care, safe sleep practices and environment. Call bell within reach./initiate/review safe skin-to-skin/initiate/review hand expression/initiate/review techniques for position and latch/post discharge community resources provided/initiate/review supplementation plan due to medical indications/review techniques to increase milk supply/review techniques to manage sore nipples/engorgement/initiate/review finger suck/initiate/review breast massage/compression/initiate/review alternate feeding method/reviewed components of an effective feeding and at least 8 effective feedings per day required/reviewed importance of monitoring infant diapers, the breastfeeding log, and minimum output each day/reviewed risks of unnecessary formula supplementation/reviewed risks of artificial nipples/reviewed strategies to transition to breastfeeding only/reviewed benefits and recommendations for rooming in/reviewed feeding on demand/by cue at least 8 times a day/recommended follow-up with pediatrician within 24 hours of discharge/reviewed indications of inadequate milk transfer that would require supplementation

## 2025-06-24 NOTE — CHART NOTE - NSCHARTNOTEFT_GEN_A_CORE
OB/GYN PA NOTE    RN notified PA regarding PVCs on PACU monitor    Patient seen and examined at bedside. Patient states she is feeling well. Patient tolerating PO hydration without nausea/vomiting. Patient states pain well controlled on current pain regimen. Patient denies lightheadedness, dizziness, shortness of breath, chest pain, fever/chills, rash, palpitations  Patient states she has a history of PVCs and "skipped beats" she got a work up with Dr White on donte leal in September 2024 and was all okay, states she had a EKG/Stress test and TTE  Followed in pregnancy by Dr Nguyễn who states all normal     Vital Signs:  ICU Vital Signs Last 24 Hrs  T(C): 37.4 (24 Jun 2025 15:53), Max: 37.4 (24 Jun 2025 15:30)  T(F): 99.3 (24 Jun 2025 15:53), Max: 99.3 (24 Jun 2025 15:30)  HR: 97 (24 Jun 2025 17:30) (58 - 97)  BP: 143/88 (24 Jun 2025 17:30) (100/66 - 168/95)  BP(mean): 102 (24 Jun 2025 17:30) (77 - 118)  ABP: --  ABP(mean): --  RR: 23 (24 Jun 2025 17:30) (9 - 23)  SpO2: 100% (24 Jun 2025 17:30) (93% - 100%)    Gen: NAD, A+O x 3, resting comfortably in bed  Resp: clear to auscultation bilaterally  Cardio: regular rate and rhythm   Dressing: Clean/Dry/Intact   Abd: Soft, non-distended, non-tender to palpation in all 4 quadrants, no rebound/guarding  Fundus: Firm, midline, 1 below, appropriately tender to palpation  Lochia: Minimal  Ext: Warm, well perfused bilaterally, SCDs bilaterally     UOP: adequate, appears clear    A&P: 39 year old POD 0 s/p pLTCS and cerclage removal with history of myomectomy, with intermittent PVCs noted on monitor, with known history of PVCs as per patient, complicated by siPEC with SF, asymptomatic, vital signs stable  - Perform EKG, once performed and reviewed cleared to go upstairs  - Continue with all other plans for patient  - Continue routine postpartum and post-operative care  Discussed with Dr. Ram OB/GYN PA NOTE    RN notified PA regarding PVCs on PACU monitor    Patient seen and examined at bedside. Patient states she is feeling well. Patient tolerating PO hydration without nausea/vomiting. Patient states pain well controlled on current pain regimen. Patient denies lightheadedness, dizziness, shortness of breath, chest pain, fever/chills, rash, palpitations  Patient states she has a history of PVCs and "skipped beats" she got a work up with Dr White on donte leal in September 2024 and was all okay, states she had a EKG/Stress test and TTE  Followed in pregnancy by Dr Nguyễn, as per chart review in [facility] work up all normal    Vital Signs:  ICU Vital Signs Last 24 Hrs  T(C): 37.4 (24 Jun 2025 15:53), Max: 37.4 (24 Jun 2025 15:30)  T(F): 99.3 (24 Jun 2025 15:53), Max: 99.3 (24 Jun 2025 15:30)  HR: 97 (24 Jun 2025 17:30) (58 - 97)  BP: 143/88 (24 Jun 2025 17:30) (100/66 - 168/95)  BP(mean): 102 (24 Jun 2025 17:30) (77 - 118)  ABP: --  ABP(mean): --  RR: 23 (24 Jun 2025 17:30) (9 - 23)  SpO2: 100% (24 Jun 2025 17:30) (93% - 100%)    Gen: NAD, A+O x 3, resting comfortably in bed  Resp: clear to auscultation bilaterally  Cardio: regular rate and rhythm   Dressing: Clean/Dry/Intact   Abd: Soft, non-distended, non-tender to palpation in all 4 quadrants, no rebound/guarding  Fundus: Firm, midline, 1 below, appropriately tender to palpation  Lochia: Minimal  Ext: Warm, well perfused bilaterally, SCDs bilaterally     UOP: adequate, appears clear    A&P: 39 year old POD 0 s/p pLTCS and cerclage removal with history of myomectomy, with intermittent PVCs noted on monitor, with known history of PVCs as per patient, complicated by siPEC with SF, asymptomatic, vital signs stable  - Perform EKG, once performed and reviewed cleared to go upstairs  - Continue with all other plans for patient  - Continue routine postpartum and post-operative care  Discussed with Dr. Ram

## 2025-06-24 NOTE — DISCHARGE NOTE OB - ADDITIONAL INSTRUCTIONS
Follow  up in 1 week for BP check  Continue taking BPs three times a day Follow  up in 3 days for BP check at [facility] office Follow up @ [facility] office in 3days for PP BP Check  Monitor BP @ home 3 times daily (morning/noon/night)  keep a strict blood pressure log and bring to the office 3-4 days after hospital discharge for review  if you have BP > 160/100 call the office for further advise  if you have headaches, vision changes, upper abdominal pain call the office to notify and go to [facility] Triage for evaluation

## 2025-06-24 NOTE — OB RN PATIENT PROFILE - THE METHOD OF FEEDING WHEN THE NEWBORN REQUESTS AND CONTINUING EACH FEEDING SESSION UNTIL THE NEWBORN IS SATISFIED
Patient's mom notified of providers message as written.   Patient's mom verbalized understanding and has no further questions or concerns.  Marlen Robin RN - BC       Statement Selected

## 2025-06-24 NOTE — DISCHARGE NOTE OB - CARE PLAN
Principal Discharge DX:	S/P  section  Assessment and plan of treatment:	Term pregnancy now delivered  Routine post op care   1 Principal Discharge DX:	S/P  section  Assessment and plan of treatment:	Term pregnancy now delivered  Routine post op care  Secondary Diagnosis:	Chronic hypertension with superimposed preeclampsia  Assessment and plan of treatment:	Continue with Labetalol 300mg every 8hrs and Procardia 30XL Daily     Follow-up in your OB office within 3 days for a blood pressure check.   Please take your blood pressure 3x/day. Call your doctor if your blood pressure is 140 (top number) OR 90 (bottom number) or higher. Return to hospital if your blood pressure is 160 (top number)  (bottom number) or higher. Call your doctor if you experience symptoms such as headache, blurry vision, epigastric pain, or nausea/vomiting.

## 2025-06-24 NOTE — DISCHARGE NOTE OB - FINANCIAL ASSISTANCE
[facility] provides services at a reduced cost to those who are determined to be eligible through [facility]’s financial assistance program. Information regarding [facility]’s financial assistance program can be found by going to [email or web address] or by calling [phone].

## 2025-06-24 NOTE — OB PROVIDER DELIVERY SUMMARY - NSLOWPPHRISK_OBGYN_A_OB
No previous uterine incision/Yoo Pregnancy/Less than or equal to 4 previous vaginal births/No known bleeding disorder/No history of postpartum hemorrhage

## 2025-06-24 NOTE — DISCHARGE NOTE OB - CARE PROVIDERS DIRECT ADDRESSES
[email or web address]

## 2025-06-24 NOTE — LACTATION INITIAL EVALUATION - NS LACT CON REASON FOR REQ
<12hr pt on Mag, Given Formula. Recommended more breastfeeding and less formula feeding. Supplementation risks discussed. Strategies reviewed on getting baby on breast more often./primaparous mom/staff request/patient request

## 2025-06-24 NOTE — DISCHARGE NOTE OB - PLAN OF CARE
Term pregnancy now delivered  Routine post op care Continue with Labetalol 300mg every 8hrs and Procardia 30XL Daily     Follow-up in your OB office within 3 days for a blood pressure check.   Please take your blood pressure 3x/day. Call your doctor if your blood pressure is 140 (top number) OR 90 (bottom number) or higher. Return to hospital if your blood pressure is 160 (top number)  (bottom number) or higher. Call your doctor if you experience symptoms such as headache, blurry vision, epigastric pain, or nausea/vomiting.

## 2025-06-24 NOTE — OB RN INTRAOPERATIVE NOTE - NSSELHIDDEN_OBGYN_ALL_OB_FT
[NS_DeliveryAttending1_OBGYN_ALL_OB_FT:[id number]=],[NS_DeliveryAssist1_OBGYN_ALL_OB_FT:[id number]=],[NS_DeliveryRN_OBGYN_ALL_OB_FT:[id number]==]

## 2025-06-24 NOTE — DISCHARGE NOTE OB - CARE PROVIDER_API CALL
Octavia Ram  Obstetrics & Gynecology  [address] [place] [id number]  Phone: [phone]  Follow Up Time:

## 2025-06-24 NOTE — OB PROVIDER DELIVERY SUMMARY - NSSELHIDDEN_OBGYN_ALL_OB_FT
[NS_DeliveryAttending1_OBGYN_ALL_OB_FT:[id number]=],[NS_DeliveryAssist1_OBGYN_ALL_OB_FT:[id number]=],[NS_DeliveryRN_OBGYN_ALL_OB_FT:[id number]==]

## 2025-06-24 NOTE — DISCHARGE NOTE OB - PATIENT PORTAL LINK FT
You can access the FollowMyHealth Patient Portal offered by [facility] by registering at the following website: [email or web address]. By joining [employer]s FollowMyHealth portal, you will also be able to view your health information using other applications (apps) compatible with our system.

## 2025-06-24 NOTE — DISCHARGE NOTE OB - MEDICATION SUMMARY - MEDICATIONS TO STOP TAKING
I will STOP taking the medications listed below when I get home from the hospital:    aspirin 81 mg oral capsule  -- 2 cap(s) by mouth once a day

## 2025-06-24 NOTE — OB PROVIDER DELIVERY SUMMARY - NSPROVIDERDELIVERYNOTE_OBGYN_ALL_OB_FT
scheduled pLTCS for history of myomectomy  Viable female infant   APGARS 9/9  2890 g  Hysterotomy closed in 1 layer using vicryl   Grossly normal uterus, tubes and ovaries not visualized   Abdomen closed in standard fashion  Pt to recovery in stable condition  Cervical cerclage removed at conclusion of case     EBL: 343  IVF: 2500  UOP: 150    Dictation #: scheduled pLTCS for history of myomectomy  Viable female infant   APGARS 9/9  2890 g  Hysterotomy closed in 1 layer using vicryl   Grossly normal uterus, tubes and ovaries not visualized   Abdomen closed in standard fashion  Pt to recovery in stable condition  Cervical cerclage removed at conclusion of case     EBL: 343  IVF: 2500  UOP: 150    Dictation #: [id number]

## 2025-06-24 NOTE — OB PROVIDER H&P - HISTORY OF PRESENT ILLNESS
39 year old pregnant female presents to presurgical testing scheduled for Primary Caesarean section. Patient denies vaginal  bleeding, spotting or leakage of amniotic fluid. Patient denies regular contractions. Patient reports positive fetal movement.  38yo  38 wks gestations presents for scheduled pC/S 2/2 prior Lsc myomectomy in the setting of cHTN. Denies PEC sxs.     – PNC: Anemia, cHTN. Last saw cards  with hx of cHTN dx in first trimester. On Lab 300 TID last increase on  from Lab 200 TID.  GBS neg.  EFW 3200g by sono.  – OBHx:   – GynHx: denies  –Allergies:  – Meds:   – PMH: denies  – PSH: denies  – Psych/Social: denies        Objective  – PE:   CV: RRR  Pulm: breathing comfortably on RA  Abd: gravid, nontender  Extr: moving all extremities with ease  – FS:   – Spec: pooling, nitrazine, ferning, bleeding,  (lesions if patient with HSV2 history)  – VE: //  – FHT: baseline 1, mod variability, +accels, -decels  – Harwood: qmin  – EFW: _g by sono  – Sono: vertex    Assessment  – No prenatal issues. GBS _.    Plan  1. Admit to LND. Routine Labs. IVF.  2. Expectant management/IOL w/  3. Fetus: cat 1 tracing. VTX. EFW _g by sono. Continuous EFM. Sono. No concerns.  4. Prenatal issues: none  5. GBS _  6. Pain: IV pain meds/epidural PRN    Patient discussed with attending physician,  .    John Rodríguez MD PGY2 38yo  38 wks gestations presents for scheduled pC/S 2/2 prior Lsc myomectomy in the setting of cHTN. Denies PEC sxs.     – PNC: Anemia, cHTN. Last saw cards  with hx of cHTN dx in first trimester. On Lab 300 TID last increase on  from Lab 200 TID.  GBS neg.  EFW 3200g by gabino.  – OBHx:    ectopic s/p R salpingectomy   PTL@19wks s/p D&E   Incomplete AB @13wks s/p D&C   anovulatory gestation s/p med AB  – GynHx: 2019 Lsc Myomectomy   –Allergies: NKDA  – Meds: Lab 300 TID, pnv , ASA  – PMH: cHTN, Anemia  – PSH: Lsc myomectomy 2019  – Psych/Social: denies

## 2025-06-24 NOTE — LACTATION INITIAL EVALUATION - INTERVENTION OUTCOME
Primary nurse made aware of current status./verbalizes understanding/demonstrates understanding of teaching

## 2025-06-24 NOTE — OB PROVIDER H&P - NSHPPHYSICALEXAM_GEN_ALL_CORE
Billing Type: Third-Party Bill Objective  – PE:   CV: RRR  Pulm: breathing comfortably on RA  Abd: gravid, nontender  Extr: moving all extremities with ease  – FHT: baseline 130, mod variability, +accels, -decels  – EFW: 3200g by sono  – Sono: vertex

## 2025-06-25 LAB
ALBUMIN SERPL ELPH-MCNC: 3 G/DL — LOW (ref 3.3–5)
ALP SERPL-CCNC: 158 U/L — HIGH (ref 40–120)
ALT FLD-CCNC: 18 U/L — SIGNIFICANT CHANGE UP (ref 4–33)
ANION GAP SERPL CALC-SCNC: 13 MMOL/L — SIGNIFICANT CHANGE UP (ref 7–14)
AST SERPL-CCNC: 28 U/L — SIGNIFICANT CHANGE UP (ref 4–32)
BASOPHILS # BLD AUTO: 0.05 K/UL — SIGNIFICANT CHANGE UP (ref 0–0.2)
BASOPHILS NFR BLD AUTO: 0.3 % — SIGNIFICANT CHANGE UP (ref 0–2)
BILIRUB SERPL-MCNC: 0.3 MG/DL — SIGNIFICANT CHANGE UP (ref 0.2–1.2)
BUN SERPL-MCNC: 7 MG/DL — SIGNIFICANT CHANGE UP (ref 7–23)
CALCIUM SERPL-MCNC: 7.6 MG/DL — LOW (ref 8.4–10.5)
CHLORIDE SERPL-SCNC: 100 MMOL/L — SIGNIFICANT CHANGE UP (ref 98–107)
CO2 SERPL-SCNC: 22 MMOL/L — SIGNIFICANT CHANGE UP (ref 22–31)
CREAT SERPL-MCNC: 0.9 MG/DL — SIGNIFICANT CHANGE UP (ref 0.5–1.3)
EGFR: 83 ML/MIN/1.73M2 — SIGNIFICANT CHANGE UP
EGFR: 83 ML/MIN/1.73M2 — SIGNIFICANT CHANGE UP
EOSINOPHIL # BLD AUTO: 0.03 K/UL — SIGNIFICANT CHANGE UP (ref 0–0.5)
EOSINOPHIL NFR BLD AUTO: 0.2 % — SIGNIFICANT CHANGE UP (ref 0–6)
GLUCOSE SERPL-MCNC: 93 MG/DL — SIGNIFICANT CHANGE UP (ref 70–99)
HCT VFR BLD CALC: 31 % — LOW (ref 34.5–45)
HGB BLD-MCNC: 10.5 G/DL — LOW (ref 11.5–15.5)
IMM GRANULOCYTES # BLD AUTO: 0.08 K/UL — HIGH (ref 0–0.07)
IMM GRANULOCYTES NFR BLD AUTO: 0.5 % — SIGNIFICANT CHANGE UP (ref 0–0.9)
LDH SERPL L TO P-CCNC: 258 U/L — HIGH (ref 135–225)
LYMPHOCYTES # BLD AUTO: 1.23 K/UL — SIGNIFICANT CHANGE UP (ref 1–3.3)
LYMPHOCYTES NFR BLD AUTO: 8.4 % — LOW (ref 13–44)
MAGNESIUM SERPL-MCNC: 4.7 MG/DL — HIGH (ref 1.6–2.6)
MAGNESIUM SERPL-MCNC: 5.3 MG/DL — HIGH (ref 1.6–2.6)
MCHC RBC-ENTMCNC: 27.1 PG — SIGNIFICANT CHANGE UP (ref 27–34)
MCHC RBC-ENTMCNC: 33.9 G/DL — SIGNIFICANT CHANGE UP (ref 32–36)
MCV RBC AUTO: 79.9 FL — LOW (ref 80–100)
MONOCYTES # BLD AUTO: 0.81 K/UL — SIGNIFICANT CHANGE UP (ref 0–0.9)
MONOCYTES NFR BLD AUTO: 5.5 % — SIGNIFICANT CHANGE UP (ref 2–14)
NEUTROPHILS # BLD AUTO: 12.52 K/UL — HIGH (ref 1.8–7.4)
NEUTROPHILS NFR BLD AUTO: 85.1 % — HIGH (ref 43–77)
NRBC # BLD AUTO: 0 K/UL — SIGNIFICANT CHANGE UP (ref 0–0)
NRBC # FLD: 0 K/UL — SIGNIFICANT CHANGE UP (ref 0–0)
NRBC BLD AUTO-RTO: 0 /100 WBCS — SIGNIFICANT CHANGE UP (ref 0–0)
PLATELET # BLD AUTO: 162 K/UL — SIGNIFICANT CHANGE UP (ref 150–400)
PMV BLD: 12.6 FL — SIGNIFICANT CHANGE UP (ref 7–13)
POTASSIUM SERPL-MCNC: 4.2 MMOL/L — SIGNIFICANT CHANGE UP (ref 3.5–5.3)
POTASSIUM SERPL-SCNC: 4.2 MMOL/L — SIGNIFICANT CHANGE UP (ref 3.5–5.3)
PROT SERPL-MCNC: 6 G/DL — SIGNIFICANT CHANGE UP (ref 6–8.3)
RBC # BLD: 3.88 M/UL — SIGNIFICANT CHANGE UP (ref 3.8–5.2)
RBC # FLD: 13.6 % — SIGNIFICANT CHANGE UP (ref 10.3–14.5)
SODIUM SERPL-SCNC: 135 MMOL/L — SIGNIFICANT CHANGE UP (ref 135–145)
URATE SERPL-MCNC: 7.1 MG/DL — HIGH (ref 2.5–7)
WBC # BLD: 14.72 K/UL — HIGH (ref 3.8–10.5)
WBC # FLD AUTO: 14.72 K/UL — HIGH (ref 3.8–10.5)

## 2025-06-25 RX ORDER — PRENATAL 136/IRON/FOLIC ACID 27 MG-1 MG
1 TABLET ORAL DAILY
Refills: 0 | Status: DISCONTINUED | OUTPATIENT
Start: 2025-06-25 | End: 2025-06-27

## 2025-06-25 RX ORDER — IBUPROFEN 200 MG
600 TABLET ORAL EVERY 6 HOURS
Refills: 0 | Status: DISCONTINUED | OUTPATIENT
Start: 2025-06-25 | End: 2025-06-27

## 2025-06-25 RX ORDER — FERROUS SULFATE 137(45) MG
325 TABLET, EXTENDED RELEASE ORAL DAILY
Refills: 0 | Status: DISCONTINUED | OUTPATIENT
Start: 2025-06-25 | End: 2025-06-27

## 2025-06-25 RX ORDER — SENNA 187 MG
2 TABLET ORAL AT BEDTIME
Refills: 0 | Status: DISCONTINUED | OUTPATIENT
Start: 2025-06-25 | End: 2025-06-27

## 2025-06-25 RX ORDER — IBUPROFEN 200 MG
1 TABLET ORAL
Qty: 0 | Refills: 0 | DISCHARGE
Start: 2025-06-25

## 2025-06-25 RX ORDER — NIFEDIPINE 30 MG
1 TABLET, EXTENDED RELEASE 24 HR ORAL
Refills: 0 | DISCHARGE
Start: 2025-06-25

## 2025-06-25 RX ORDER — ACETAMINOPHEN 500 MG/5ML
3 LIQUID (ML) ORAL
Qty: 0 | Refills: 0 | DISCHARGE
Start: 2025-06-25

## 2025-06-25 RX ADMIN — Medication 600 MILLIGRAM(S): at 23:59

## 2025-06-25 RX ADMIN — Medication 600 MILLIGRAM(S): at 18:30

## 2025-06-25 RX ADMIN — Medication 975 MILLIGRAM(S): at 09:00

## 2025-06-25 RX ADMIN — HEPARIN SODIUM 5000 UNIT(S): 1000 INJECTION INTRAVENOUS; SUBCUTANEOUS at 23:58

## 2025-06-25 RX ADMIN — LABETALOL HYDROCHLORIDE 300 MILLIGRAM(S): 200 TABLET, FILM COATED ORAL at 21:46

## 2025-06-25 RX ADMIN — KETOROLAC TROMETHAMINE 30 MILLIGRAM(S): 30 INJECTION, SOLUTION INTRAMUSCULAR; INTRAVENOUS at 00:30

## 2025-06-25 RX ADMIN — KETOROLAC TROMETHAMINE 30 MILLIGRAM(S): 30 INJECTION, SOLUTION INTRAMUSCULAR; INTRAVENOUS at 12:00

## 2025-06-25 RX ADMIN — HEPARIN SODIUM 5000 UNIT(S): 1000 INJECTION INTRAVENOUS; SUBCUTANEOUS at 11:42

## 2025-06-25 RX ADMIN — LABETALOL HYDROCHLORIDE 300 MILLIGRAM(S): 200 TABLET, FILM COATED ORAL at 06:06

## 2025-06-25 RX ADMIN — KETOROLAC TROMETHAMINE 30 MILLIGRAM(S): 30 INJECTION, SOLUTION INTRAMUSCULAR; INTRAVENOUS at 01:30

## 2025-06-25 RX ADMIN — Medication 975 MILLIGRAM(S): at 14:42

## 2025-06-25 RX ADMIN — KETOROLAC TROMETHAMINE 30 MILLIGRAM(S): 30 INJECTION, SOLUTION INTRAMUSCULAR; INTRAVENOUS at 11:42

## 2025-06-25 RX ADMIN — LABETALOL HYDROCHLORIDE 300 MILLIGRAM(S): 200 TABLET, FILM COATED ORAL at 14:42

## 2025-06-25 RX ADMIN — Medication 975 MILLIGRAM(S): at 21:45

## 2025-06-25 RX ADMIN — Medication 975 MILLIGRAM(S): at 22:23

## 2025-06-25 RX ADMIN — Medication 600 MILLIGRAM(S): at 17:50

## 2025-06-25 RX ADMIN — Medication 975 MILLIGRAM(S): at 09:35

## 2025-06-25 RX ADMIN — HEPARIN SODIUM 5000 UNIT(S): 1000 INJECTION INTRAVENOUS; SUBCUTANEOUS at 00:29

## 2025-06-25 RX ADMIN — Medication 30 MILLIGRAM(S): at 14:42

## 2025-06-25 RX ADMIN — Medication 975 MILLIGRAM(S): at 15:15

## 2025-06-25 RX ADMIN — Medication 50 GM/HR: at 07:08

## 2025-06-26 RX ORDER — PRENATAL 136/IRON/FOLIC ACID 27 MG-1 MG
1 TABLET ORAL
Qty: 0 | Refills: 0 | DISCHARGE
Start: 2025-06-26

## 2025-06-26 RX ORDER — FERROUS SULFATE 137(45) MG
1 TABLET, EXTENDED RELEASE ORAL
Qty: 0 | Refills: 0 | DISCHARGE
Start: 2025-06-26

## 2025-06-26 RX ADMIN — Medication 975 MILLIGRAM(S): at 21:43

## 2025-06-26 RX ADMIN — Medication 600 MILLIGRAM(S): at 00:46

## 2025-06-26 RX ADMIN — Medication 975 MILLIGRAM(S): at 09:15

## 2025-06-26 RX ADMIN — Medication 600 MILLIGRAM(S): at 13:00

## 2025-06-26 RX ADMIN — Medication 600 MILLIGRAM(S): at 18:05

## 2025-06-26 RX ADMIN — Medication 500 MILLIGRAM(S): at 12:21

## 2025-06-26 RX ADMIN — Medication 1 TABLET(S): at 12:21

## 2025-06-26 RX ADMIN — LABETALOL HYDROCHLORIDE 300 MILLIGRAM(S): 200 TABLET, FILM COATED ORAL at 21:43

## 2025-06-26 RX ADMIN — Medication 600 MILLIGRAM(S): at 17:33

## 2025-06-26 RX ADMIN — HEPARIN SODIUM 5000 UNIT(S): 1000 INJECTION INTRAVENOUS; SUBCUTANEOUS at 12:21

## 2025-06-26 RX ADMIN — Medication 975 MILLIGRAM(S): at 03:23

## 2025-06-26 RX ADMIN — LABETALOL HYDROCHLORIDE 300 MILLIGRAM(S): 200 TABLET, FILM COATED ORAL at 14:40

## 2025-06-26 RX ADMIN — Medication 975 MILLIGRAM(S): at 22:40

## 2025-06-26 RX ADMIN — Medication 975 MILLIGRAM(S): at 08:44

## 2025-06-26 RX ADMIN — LABETALOL HYDROCHLORIDE 300 MILLIGRAM(S): 200 TABLET, FILM COATED ORAL at 06:19

## 2025-06-26 RX ADMIN — Medication 600 MILLIGRAM(S): at 12:21

## 2025-06-26 RX ADMIN — Medication 30 MILLIGRAM(S): at 14:43

## 2025-06-26 RX ADMIN — Medication 325 MILLIGRAM(S): at 12:21

## 2025-06-26 RX ADMIN — Medication 600 MILLIGRAM(S): at 06:47

## 2025-06-26 RX ADMIN — Medication 975 MILLIGRAM(S): at 03:53

## 2025-06-26 RX ADMIN — Medication 975 MILLIGRAM(S): at 15:15

## 2025-06-26 RX ADMIN — Medication 600 MILLIGRAM(S): at 06:18

## 2025-06-26 RX ADMIN — Medication 975 MILLIGRAM(S): at 14:39

## 2025-06-27 ENCOUNTER — EMERGENCY (EMERGENCY)
Facility: HOSPITAL | Age: 40
LOS: 1 days | End: 2025-06-27
Admitting: EMERGENCY MEDICINE
Payer: COMMERCIAL

## 2025-06-27 ENCOUNTER — INPATIENT (INPATIENT)
Facility: HOSPITAL | Age: 40
LOS: 0 days | Discharge: HOME | End: 2025-06-28
Attending: STUDENT IN AN ORGANIZED HEALTH CARE EDUCATION/TRAINING PROGRAM | Admitting: STUDENT IN AN ORGANIZED HEALTH CARE EDUCATION/TRAINING PROGRAM
Payer: COMMERCIAL

## 2025-06-27 ENCOUNTER — APPOINTMENT (OUTPATIENT)
Dept: ANTEPARTUM | Facility: CLINIC | Age: 40
End: 2025-06-27

## 2025-06-27 VITALS — TEMPERATURE: 98 F

## 2025-06-27 VITALS
DIASTOLIC BLOOD PRESSURE: 84 MMHG | SYSTOLIC BLOOD PRESSURE: 141 MMHG | RESPIRATION RATE: 18 BRPM | HEART RATE: 79 BPM | OXYGEN SATURATION: 100 % | TEMPERATURE: 98 F

## 2025-06-27 VITALS
HEIGHT: 67 IN | DIASTOLIC BLOOD PRESSURE: 80 MMHG | HEART RATE: 79 BPM | SYSTOLIC BLOOD PRESSURE: 155 MMHG | TEMPERATURE: 98 F | OXYGEN SATURATION: 98 % | WEIGHT: 216.93 LBS | RESPIRATION RATE: 18 BRPM

## 2025-06-27 DIAGNOSIS — Z90.79 ACQUIRED ABSENCE OF OTHER GENITAL ORGAN(S): Chronic | ICD-10-CM

## 2025-06-27 DIAGNOSIS — Z98.890 OTHER SPECIFIED POSTPROCEDURAL STATES: Chronic | ICD-10-CM

## 2025-06-27 DIAGNOSIS — O26.899 OTHER SPECIFIED PREGNANCY RELATED CONDITIONS, UNSPECIFIED TRIMESTER: ICD-10-CM

## 2025-06-27 LAB
ALBUMIN SERPL ELPH-MCNC: 3.4 G/DL — SIGNIFICANT CHANGE UP (ref 3.3–5)
ALP SERPL-CCNC: 148 U/L — HIGH (ref 40–120)
ALT FLD-CCNC: 53 U/L — HIGH (ref 4–33)
ANION GAP SERPL CALC-SCNC: 13 MMOL/L — SIGNIFICANT CHANGE UP (ref 7–14)
AST SERPL-CCNC: 82 U/L — HIGH (ref 4–32)
BASOPHILS # BLD AUTO: 0.06 K/UL — SIGNIFICANT CHANGE UP (ref 0–0.2)
BASOPHILS NFR BLD AUTO: 0.7 % — SIGNIFICANT CHANGE UP (ref 0–2)
BILIRUB SERPL-MCNC: 0.5 MG/DL — SIGNIFICANT CHANGE UP (ref 0.2–1.2)
BLD GP AB SCN SERPL QL: NEGATIVE — SIGNIFICANT CHANGE UP
BUN SERPL-MCNC: 12 MG/DL — SIGNIFICANT CHANGE UP (ref 7–23)
CALCIUM SERPL-MCNC: 9.7 MG/DL — SIGNIFICANT CHANGE UP (ref 8.4–10.5)
CHLORIDE SERPL-SCNC: 102 MMOL/L — SIGNIFICANT CHANGE UP (ref 98–107)
CO2 SERPL-SCNC: 23 MMOL/L — SIGNIFICANT CHANGE UP (ref 22–31)
CREAT SERPL-MCNC: 0.85 MG/DL — SIGNIFICANT CHANGE UP (ref 0.5–1.3)
EGFR: 89 ML/MIN/1.73M2 — SIGNIFICANT CHANGE UP
EGFR: 89 ML/MIN/1.73M2 — SIGNIFICANT CHANGE UP
EOSINOPHIL # BLD AUTO: 0.34 K/UL — SIGNIFICANT CHANGE UP (ref 0–0.5)
EOSINOPHIL NFR BLD AUTO: 3.7 % — SIGNIFICANT CHANGE UP (ref 0–6)
GLUCOSE SERPL-MCNC: 79 MG/DL — SIGNIFICANT CHANGE UP (ref 70–99)
HCT VFR BLD CALC: 34.9 % — SIGNIFICANT CHANGE UP (ref 34.5–45)
HGB BLD-MCNC: 11.4 G/DL — LOW (ref 11.5–15.5)
IMM GRANULOCYTES # BLD AUTO: 0.04 K/UL — SIGNIFICANT CHANGE UP (ref 0–0.07)
IMM GRANULOCYTES NFR BLD AUTO: 0.4 % — SIGNIFICANT CHANGE UP (ref 0–0.9)
LDH SERPL L TO P-CCNC: 324 U/L — HIGH (ref 135–225)
LYMPHOCYTES # BLD AUTO: 1.29 K/UL — SIGNIFICANT CHANGE UP (ref 1–3.3)
LYMPHOCYTES NFR BLD AUTO: 14.1 % — SIGNIFICANT CHANGE UP (ref 13–44)
MCHC RBC-ENTMCNC: 27 PG — SIGNIFICANT CHANGE UP (ref 27–34)
MCHC RBC-ENTMCNC: 32.7 G/DL — SIGNIFICANT CHANGE UP (ref 32–36)
MCV RBC AUTO: 82.7 FL — SIGNIFICANT CHANGE UP (ref 80–100)
MONOCYTES # BLD AUTO: 0.5 K/UL — SIGNIFICANT CHANGE UP (ref 0–0.9)
MONOCYTES NFR BLD AUTO: 5.5 % — SIGNIFICANT CHANGE UP (ref 2–14)
NEUTROPHILS # BLD AUTO: 6.9 K/UL — SIGNIFICANT CHANGE UP (ref 1.8–7.4)
NEUTROPHILS NFR BLD AUTO: 75.6 % — SIGNIFICANT CHANGE UP (ref 43–77)
NRBC # BLD AUTO: 0 K/UL — SIGNIFICANT CHANGE UP (ref 0–0)
NRBC # FLD: 0 K/UL — SIGNIFICANT CHANGE UP (ref 0–0)
NRBC BLD AUTO-RTO: 0 /100 WBCS — SIGNIFICANT CHANGE UP (ref 0–0)
PLATELET # BLD AUTO: 200 K/UL — SIGNIFICANT CHANGE UP (ref 150–400)
PMV BLD: 12.5 FL — SIGNIFICANT CHANGE UP (ref 7–13)
POTASSIUM SERPL-MCNC: 4.3 MMOL/L — SIGNIFICANT CHANGE UP (ref 3.5–5.3)
POTASSIUM SERPL-SCNC: 4.3 MMOL/L — SIGNIFICANT CHANGE UP (ref 3.5–5.3)
PROT SERPL-MCNC: 6.8 G/DL — SIGNIFICANT CHANGE UP (ref 6–8.3)
RBC # BLD: 4.22 M/UL — SIGNIFICANT CHANGE UP (ref 3.8–5.2)
RBC # FLD: 13.7 % — SIGNIFICANT CHANGE UP (ref 10.3–14.5)
RH IG SCN BLD-IMP: POSITIVE — SIGNIFICANT CHANGE UP
SODIUM SERPL-SCNC: 138 MMOL/L — SIGNIFICANT CHANGE UP (ref 135–145)
URATE SERPL-MCNC: 6.7 MG/DL — SIGNIFICANT CHANGE UP (ref 2.5–7)
WBC # BLD: 9.13 K/UL — SIGNIFICANT CHANGE UP (ref 3.8–10.5)
WBC # FLD AUTO: 9.13 K/UL — SIGNIFICANT CHANGE UP (ref 3.8–10.5)

## 2025-06-27 PROCEDURE — L9996: CPT

## 2025-06-27 RX ORDER — IBUPROFEN 200 MG
600 TABLET ORAL EVERY 6 HOURS
Refills: 0 | Status: DISCONTINUED | OUTPATIENT
Start: 2025-06-27 | End: 2025-06-28

## 2025-06-27 RX ORDER — LABETALOL HYDROCHLORIDE 200 MG/1
400 TABLET, FILM COATED ORAL EVERY 8 HOURS
Refills: 0 | Status: DISCONTINUED | OUTPATIENT
Start: 2025-06-27 | End: 2025-06-28

## 2025-06-27 RX ORDER — DOCUSATE SODIUM 100 MG
2 CAPSULE ORAL
Refills: 0 | DISCHARGE

## 2025-06-27 RX ORDER — NIFEDIPINE 30 MG
10 TABLET, EXTENDED RELEASE 24 HR ORAL ONCE
Refills: 0 | Status: COMPLETED | OUTPATIENT
Start: 2025-06-27 | End: 2025-06-27

## 2025-06-27 RX ORDER — LABETALOL HYDROCHLORIDE 200 MG/1
400 TABLET, FILM COATED ORAL EVERY 8 HOURS
Refills: 0 | Status: DISCONTINUED | OUTPATIENT
Start: 2025-06-27 | End: 2025-06-27

## 2025-06-27 RX ORDER — LABETALOL HYDROCHLORIDE 200 MG/1
100 TABLET, FILM COATED ORAL ONCE
Refills: 0 | Status: COMPLETED | OUTPATIENT
Start: 2025-06-27 | End: 2025-06-27

## 2025-06-27 RX ORDER — NIFEDIPINE 30 MG
30 TABLET, EXTENDED RELEASE 24 HR ORAL
Refills: 0 | Status: DISCONTINUED | OUTPATIENT
Start: 2025-06-27 | End: 2025-06-28

## 2025-06-27 RX ORDER — ACETAMINOPHEN 500 MG/5ML
975 LIQUID (ML) ORAL
Refills: 0 | Status: DISCONTINUED | OUTPATIENT
Start: 2025-06-27 | End: 2025-06-27

## 2025-06-27 RX ADMIN — Medication 600 MILLIGRAM(S): at 01:20

## 2025-06-27 RX ADMIN — Medication 10 MILLIGRAM(S): at 23:19

## 2025-06-27 RX ADMIN — Medication 600 MILLIGRAM(S): at 05:52

## 2025-06-27 RX ADMIN — Medication 10 MILLIGRAM(S): at 18:57

## 2025-06-27 RX ADMIN — Medication 975 MILLIGRAM(S): at 04:32

## 2025-06-27 RX ADMIN — Medication 975 MILLIGRAM(S): at 08:46

## 2025-06-27 RX ADMIN — HEPARIN SODIUM 5000 UNIT(S): 1000 INJECTION INTRAVENOUS; SUBCUTANEOUS at 00:33

## 2025-06-27 RX ADMIN — Medication 600 MILLIGRAM(S): at 06:37

## 2025-06-27 RX ADMIN — Medication 975 MILLIGRAM(S): at 03:43

## 2025-06-27 RX ADMIN — Medication 600 MILLIGRAM(S): at 00:33

## 2025-06-27 RX ADMIN — Medication 975 MILLIGRAM(S): at 20:04

## 2025-06-27 RX ADMIN — LABETALOL HYDROCHLORIDE 400 MILLIGRAM(S): 200 TABLET, FILM COATED ORAL at 22:33

## 2025-06-27 RX ADMIN — Medication 975 MILLIGRAM(S): at 08:16

## 2025-06-27 RX ADMIN — Medication 975 MILLIGRAM(S): at 20:34

## 2025-06-27 RX ADMIN — LABETALOL HYDROCHLORIDE 100 MILLIGRAM(S): 200 TABLET, FILM COATED ORAL at 18:57

## 2025-06-27 RX ADMIN — LABETALOL HYDROCHLORIDE 300 MILLIGRAM(S): 200 TABLET, FILM COATED ORAL at 05:51

## 2025-06-27 NOTE — PROGRESS NOTE ADULT - ASSESSMENT
A/P: 38yo POD#2 s/p pLTCS for hx of myomectomy . P siPEC w/SF based      #siPEC w SF   -BP x 24 hours: BP:  (115/64 - 134/68)  - Procardia  30xl Daily   - Labetalol 300 BID   - S/p  Mag   -HELLP labs WNL   -P/C 0.1  -No PEC symptoms  -BP cuff sent to home pharmacy via [facility]     #Acute blood loss Anemia   -H/H 10.5/31.0  -Iron   -Asymptomatic       #Postpartum   Her pain is well controlled.   She is tolerating a regular diet and passing flatus.   Denies N/V. Denies CP/SOB/lightheadedness/dizziness.   She is ambulating without difficulty.   Voiding spontaneously.    Patient is stable and doing well post-operatively.    - Continue regular diet.  - Increase ambulation.  - Continue motrin, tylenol PRN for pain control.   -Encourage breastfeeding.  -Incisional care and PO instructions reviewed.     Discharge Planning:   Follow up @ P office in 3days for PP BP Check  Monitor BP @ home 3 times daily (morning/noon/night)  keep a strict blood pressure log and bring to the office 3-4 days after hospital discharge for review  if you have BP > 160/100 call the office for further advise  if you have headaches, vision changes, upper abdominal pain call the office to notify and go to [facility] Triage for evaluation  [phone]    Discussed with MD eLland BERG   
A/P: 38yo POD#3 s/p pLTCS for hx of myomectomy . P siPEC w/SF based      #siPEC w SF   -BP x 24 hours: BP:  (121/65 - 145/82)  - Procardia  30xl Daily   - Labetalol 300 BID   - S/p  Mag   -HELLP labs WNL   -P/C 0.1  -No PEC symptoms  -BP cuff [facility] to home pharmacy [facility]     #Acute blood loss Anemia   -H/H 10.5/31.0  -Iron   -Asymptomatic       #Postpartum   Her pain is well controlled.   She is tolerating a regular diet and passing flatus.   Denies N/V. Denies CP/SOB/lightheadedness/dizziness.   She is ambulating without difficulty.   Voiding spontaneously.    Patient is stable and doing well post-operatively.    - Continue regular diet.  - Increase ambulation.  - Continue motrin, tylenol PRN for pain control.   -Encourage breastfeeding.  -Incisional care and PO instructions reviewed.     Discharge Planning:   Follow up @ P office in 3days for PP BP Check  Monitor BP @ home 3 times daily (morning/noon/night)  keep a strict blood pressure log and bring to the office 3-4 days after hospital discharge for review  if you have BP > 160/100 call the office for further advise  if you have headaches, vision changes, upper abdominal pain call the office to notify and go to [facility] Triage for evaluation  [phone]    Discussed with Leanna Young   
A/P: 40yo POD#1 s/p pLTCS for hx of myomectomy . Pt cHTN meeting diagnosis of siPEC w/SF based on blood pressures in the PACU s/p Pro 10 IR and Pro 30xL added to home [id number] TID.   sPEC  -cw BP monitoringBP: 115/84 (06-25-25 @ 06:06) (100/66 - 168/95)  -On Pro 30 and Lab 300 BID for HTN control  - d/c Mg 6/25@330p   -cw Mg checks as indicated  -baseline HELLP labs wnl, AM labs stable   -No PEC symptoms    - Patient is stable and doing well post-operatively.    - Continue regular diet.  - Increase ambulation.  - Continue motrin, tylenol, oxycodone PRN for pain control.    - F/u AM CBC    John Rodríguez PGY3

## 2025-06-27 NOTE — ED ADULT TRIAGE NOTE - CHIEF COMPLAINT QUOTE
pt reporting to the ED for HTN.  Pt D/C this morning form OB pt had  Tuesday advised ot come to ED if BP elevated. SBP at home 180s. reporting mild headache. pmh of pre- eclampsia. Report long Kelly in L&D to be seen in L&D.

## 2025-06-27 NOTE — PROGRESS NOTE ADULT - SUBJECTIVE AND OBJECTIVE BOX
NP: Progress Note POD #3    Pt seen, examined at bedside and doing well meeting all post operative milestones. Patient bonding well with infant. Breastfeeding  Pt states mild abdominal pain. Pt denies fever, chills, chest pain, SOB, nausea, vomiting, lightheadedness, dizziness.  Pt states passing flatus    T(F): 98 (06-27-25 @ 05:45), Max: 98.8 (06-26-25 @ 21:40)  HR: 78 (06-27-25 @ 05:45) (71 - 86)  BP: 138/66 (06-27-25 @ 05:45) (121/65 - 145/82)  RR: 17 (06-27-25 @ 05:45) (17 - 18)  SpO2: 97% (06-27-25 @ 05:45) (96% - 100%)  Wt(kg): --  CAPILLARY BLOOD GLUCOSE    I&O's Detail      MEDICATIONS  (STANDING):  acetaminophen     Tablet .. 975 milliGRAM(s) Oral <User Schedule>  ascorbic acid 500 milliGRAM(s) Oral daily  diphtheria/tetanus/pertussis (acellular) Vaccine (Adacel) 0.5 milliLiter(s) IntraMuscular once  ferrous    sulfate 325 milliGRAM(s) Oral daily  heparin   Injectable 5000 Unit(s) SubCutaneous every 12 hours  ibuprofen  Tablet. 600 milliGRAM(s) Oral every 6 hours  labetalol 300 milliGRAM(s) Oral every 8 hours  NIFEdipine XL 30 milliGRAM(s) Oral daily  prenatal multivitamin 1 Tablet(s) Oral daily  senna 2 Tablet(s) Oral at bedtime    MEDICATIONS  (PRN):  diphenhydrAMINE 25 milliGRAM(s) Oral every 6 hours PRN Pruritus  lanolin Ointment 1 Application(s) Topical every 6 hours PRN Sore Nipples  magnesium hydroxide Suspension 30 milliLiter(s) Oral two times a day PRN Constipation  oxyCODONE    IR 5 milliGRAM(s) Oral every 3 hours PRN Moderate to Severe Pain (4-10)  oxyCODONE    IR 5 milliGRAM(s) Oral once PRN Moderate to Severe Pain (4-10)  simethicone 80 milliGRAM(s) Chew every 4 hours PRN Gas      Physical Exam:  Constitutional: WDWN female, NAD AxOx3  Skin: no breakdowns noted, warm and dry  Breasts: soft, nonengorged, no reddness, no warmth bilaterally  Abdomen: Fundus firm, appropriate tenderness noted   Incision site: Incision clean and dry with dermabond intact.   GYN: Lochia light   Extremities: no lower extremity edema or calf tenderness bilaterally; intermittent compression stockings in place     LABS:             10.5   14.72 )-----------( 162      ( 06-25 @ 03:55 )             31.0                11.9   12.33 )-----------( 163      ( 06-24 @ 15:51 )             37.3                       RADIOLOGY & ADDITIONAL TESTS:        
OB Progress Note:  Delivery, POD#1    S: 38yo POD#1 s/p LTCS . Her pain is well controlled. She is tolerating a regular diet and passing flatus. Denies N/V. Denies CP/SOB/lightheadedness/dizziness.   DTV and ambulate later today.  No PEC sxs on Mg.   O:   Vital Signs Last 24 Hrs  T(C): 37.2 (2025 06:06), Max: 37.4 (2025 15:30)  T(F): 98.9 (2025 06:06), Max: 99.3 (2025 15:30)  HR: 89 (2025 06:06) (58 - 97)  BP: 115/84 (2025 06:06) (100/66 - 168/95)  BP(mean): 96 (2025 18:30) (77 - 118)  RR: 19 (2025 06:06) (9 - 23)  SpO2: 99% (2025 06:06) (93% - 100%)    Parameters below as of 2025 06:06  Patient On (Oxygen Delivery Method): room air        Labs:  Blood type: B Positive  Rubella IgG: RPR: Negative                          10.5[L]   14.72[H] >-----------< 162    (  @ 03:55 )             31.0[L]                        11.9   12.33[H] >-----------< 163    (  @ 15:51 )             37.3                        11.6   10.30 >-----------< 163    (  @ 09:15 )             35.9    25 @ 03:55      135  |  100  |  7   ----------------------------<  93  4.2   |  22  |  0.90    25 @ 15:51      135  |  104  |  7   ----------------------------<  88  4.2   |  22  |  0.77    25 @ 09:40      136  |  102  |  8   ----------------------------<  78  3.8   |  22  |  0.84        Ca    7.6[L]      2025 03:55  Ca    9.2      2025 15:51  Ca    9.3      2025 09:40  Mg     4.70[H]       Mg     3.90[H]         TPro  6.0  /  Alb  3.0[L]  /  TBili  0.3  /  DBili  x   /  AST  28  /  ALT  18  /  AlkPhos  158[H]  25 @ 03:55  TPro  6.4  /  Alb  3.3  /  TBili  0.4  /  DBili  x   /  AST  30  /  ALT  20  /  AlkPhos  191[H]  25 @ 15:51  TPro  6.6  /  Alb  3.6  /  TBili  0.4  /  DBili  x   /  AST  28  /  ALT  20  /  AlkPhos  198[H]  25 @ 09:40          PE:  General: NAD  Abdomen: Mildly distended, appropriately tender, incision c/d/i.  Extremities: No erythema, no pitting edema    
NP: Progress Note POD #2    Pt seen, examined at bedside and doing well meeting all post operative milestones. Patient bonding well with infant. Breastfeeding  Pt states mild abdominal pain. Pt denies fever, chills, chest pain, SOB, nausea, vomiting, lightheadedness, dizziness.  Pt states passing flatus    T(F): 98.2 (06-26-25 @ 10:00), Max: 98.6 (06-25-25 @ 13:00)  HR: 86 (06-26-25 @ 10:00) (75 - 98)  BP: 121/65 (06-26-25 @ 10:00) (115/64 - 134/68)  RR: 18 (06-26-25 @ 10:00) (17 - 18)  SpO2: 99% (06-26-25 @ 10:00) (96% - 100%)  Wt(kg): --  CAPILLARY BLOOD GLUCOSE    I&O's Detail    25 Jun 2025 07:01  -  26 Jun 2025 07:00  --------------------------------------------------------  IN:  Total IN: 0 mL    OUT:    Indwelling Catheter - Urethral (mL): 1500 mL    Voided (mL): 900 mL  Total OUT: 2400 mL    Total NET: -2400 mL          MEDICATIONS  (STANDING):  acetaminophen     Tablet .. 975 milliGRAM(s) Oral <User Schedule>  ascorbic acid 500 milliGRAM(s) Oral daily  diphtheria/tetanus/pertussis (acellular) Vaccine (Adacel) 0.5 milliLiter(s) IntraMuscular once  ferrous    sulfate 325 milliGRAM(s) Oral daily  heparin   Injectable 5000 Unit(s) SubCutaneous every 12 hours  ibuprofen  Tablet. 600 milliGRAM(s) Oral every 6 hours  labetalol 300 milliGRAM(s) Oral every 8 hours  NIFEdipine XL 30 milliGRAM(s) Oral daily  prenatal multivitamin 1 Tablet(s) Oral daily  senna 2 Tablet(s) Oral at bedtime    MEDICATIONS  (PRN):  diphenhydrAMINE 25 milliGRAM(s) Oral every 6 hours PRN Pruritus  lanolin Ointment 1 Application(s) Topical every 6 hours PRN Sore Nipples  magnesium hydroxide Suspension 30 milliLiter(s) Oral two times a day PRN Constipation  oxyCODONE    IR 5 milliGRAM(s) Oral every 3 hours PRN Moderate to Severe Pain (4-10)  oxyCODONE    IR 5 milliGRAM(s) Oral once PRN Moderate to Severe Pain (4-10)  simethicone 80 milliGRAM(s) Chew every 4 hours PRN Gas      Physical Exam:  Constitutional: WDWN female, NAD AxOx3  Skin: no breakdowns noted, warm and dry  Breasts: soft, nonengorged, no reddness, no warmth bilaterally  Abdomen: Fundus firm, appropriate tenderness noted   Incision site: Incision clean and dry with  dermabond intact.   GYN: Lochia light   Extremities: no lower extremity edema or calf tenderness bilaterally; intermittent compression stockings in place     LABS:             10.5   14.72 )-----------( 162      ( 06-25 @ 03:55 )             31.0                11.9   12.33 )-----------( 163      ( 06-24 @ 15:51 )             37.3                11.6   10.30 )-----------( 163      ( 06-24 @ 09:15 )             35.9       06-25    135    |  100    |  7      ----------------------------<  93     4.2     |  22     |  0.90     Ca    7.6[L]      25 Jun 2025 03:55  Mg     5.30      06-25  Mg     4.70      06-25    TPro  6.0    /  Alb  3.0[L]  /  TBili  0.3    /  DBili  x      /  AST  28     /  ALT  18     /  AlkPhos  158[H]  06-25          Urinalysis Basic - ( 25 Jun 2025 03:55 )    Color: x / Appearance: x / SG: x / pH: x  Gluc: 93 mg/dL / Ketone: x  / Bili: x / Urobili: x   Blood: x / Protein: x / Nitrite: x   Leuk Esterase: x / RBC: x / WBC x   Sq Epi: x / Non Sq Epi: x / Bacteria: x        RADIOLOGY & ADDITIONAL TESTS:

## 2025-06-28 ENCOUNTER — TRANSCRIPTION ENCOUNTER (OUTPATIENT)
Age: 40
End: 2025-06-28

## 2025-06-28 VITALS
SYSTOLIC BLOOD PRESSURE: 142 MMHG | HEART RATE: 83 BPM | DIASTOLIC BLOOD PRESSURE: 83 MMHG | OXYGEN SATURATION: 100 % | RESPIRATION RATE: 16 BRPM

## 2025-06-28 DIAGNOSIS — Z04.9 ENCOUNTER FOR EXAMINATION AND OBSERVATION FOR UNSPECIFIED REASON: ICD-10-CM

## 2025-06-28 PROBLEM — O09.529 SUPERVISION OF ELDERLY MULTIGRAVIDA, UNSPECIFIED TRIMESTER: Chronic | Status: ACTIVE | Noted: 2025-06-18

## 2025-06-28 PROBLEM — D25.9 LEIOMYOMA OF UTERUS, UNSPECIFIED: Chronic | Status: ACTIVE | Noted: 2025-06-18

## 2025-06-28 LAB
ALBUMIN SERPL ELPH-MCNC: 3.5 G/DL — SIGNIFICANT CHANGE UP (ref 3.3–5)
ALBUMIN SERPL ELPH-MCNC: 3.6 G/DL — SIGNIFICANT CHANGE UP (ref 3.3–5)
ALP SERPL-CCNC: 136 U/L — HIGH (ref 40–120)
ALP SERPL-CCNC: 141 U/L — HIGH (ref 40–120)
ALT FLD-CCNC: 107 U/L — HIGH (ref 4–33)
ALT FLD-CCNC: 84 U/L — HIGH (ref 4–33)
ANION GAP SERPL CALC-SCNC: 12 MMOL/L — SIGNIFICANT CHANGE UP (ref 7–14)
ANION GAP SERPL CALC-SCNC: 12 MMOL/L — SIGNIFICANT CHANGE UP (ref 7–14)
AST SERPL-CCNC: 114 U/L — HIGH (ref 4–32)
AST SERPL-CCNC: 95 U/L — HIGH (ref 4–32)
BASOPHILS # BLD AUTO: 0.05 K/UL — SIGNIFICANT CHANGE UP (ref 0–0.2)
BASOPHILS # BLD AUTO: 0.05 K/UL — SIGNIFICANT CHANGE UP (ref 0–0.2)
BASOPHILS NFR BLD AUTO: 0.7 % — SIGNIFICANT CHANGE UP (ref 0–2)
BASOPHILS NFR BLD AUTO: 0.7 % — SIGNIFICANT CHANGE UP (ref 0–2)
BILIRUB SERPL-MCNC: 0.6 MG/DL — SIGNIFICANT CHANGE UP (ref 0.2–1.2)
BILIRUB SERPL-MCNC: 0.6 MG/DL — SIGNIFICANT CHANGE UP (ref 0.2–1.2)
BUN SERPL-MCNC: 8 MG/DL — SIGNIFICANT CHANGE UP (ref 7–23)
BUN SERPL-MCNC: 8 MG/DL — SIGNIFICANT CHANGE UP (ref 7–23)
CALCIUM SERPL-MCNC: 8.9 MG/DL — SIGNIFICANT CHANGE UP (ref 8.4–10.5)
CALCIUM SERPL-MCNC: 9.1 MG/DL — SIGNIFICANT CHANGE UP (ref 8.4–10.5)
CHLORIDE SERPL-SCNC: 101 MMOL/L — SIGNIFICANT CHANGE UP (ref 98–107)
CHLORIDE SERPL-SCNC: 102 MMOL/L — SIGNIFICANT CHANGE UP (ref 98–107)
CO2 SERPL-SCNC: 24 MMOL/L — SIGNIFICANT CHANGE UP (ref 22–31)
CO2 SERPL-SCNC: 25 MMOL/L — SIGNIFICANT CHANGE UP (ref 22–31)
CREAT SERPL-MCNC: 0.81 MG/DL — SIGNIFICANT CHANGE UP (ref 0.5–1.3)
CREAT SERPL-MCNC: 0.85 MG/DL — SIGNIFICANT CHANGE UP (ref 0.5–1.3)
EGFR: 89 ML/MIN/1.73M2 — SIGNIFICANT CHANGE UP
EGFR: 89 ML/MIN/1.73M2 — SIGNIFICANT CHANGE UP
EGFR: 94 ML/MIN/1.73M2 — SIGNIFICANT CHANGE UP
EGFR: 94 ML/MIN/1.73M2 — SIGNIFICANT CHANGE UP
EOSINOPHIL # BLD AUTO: 0.4 K/UL — SIGNIFICANT CHANGE UP (ref 0–0.5)
EOSINOPHIL # BLD AUTO: 0.51 K/UL — HIGH (ref 0–0.5)
EOSINOPHIL NFR BLD AUTO: 5.5 % — SIGNIFICANT CHANGE UP (ref 0–6)
EOSINOPHIL NFR BLD AUTO: 6.7 % — HIGH (ref 0–6)
GLUCOSE SERPL-MCNC: 81 MG/DL — SIGNIFICANT CHANGE UP (ref 70–99)
GLUCOSE SERPL-MCNC: 81 MG/DL — SIGNIFICANT CHANGE UP (ref 70–99)
HCT VFR BLD CALC: 34.5 % — SIGNIFICANT CHANGE UP (ref 34.5–45)
HCT VFR BLD CALC: 36.6 % — SIGNIFICANT CHANGE UP (ref 34.5–45)
HGB BLD-MCNC: 11.3 G/DL — LOW (ref 11.5–15.5)
HGB BLD-MCNC: 12.2 G/DL — SIGNIFICANT CHANGE UP (ref 11.5–15.5)
IMM GRANULOCYTES # BLD AUTO: 0.05 K/UL — SIGNIFICANT CHANGE UP (ref 0–0.07)
IMM GRANULOCYTES # BLD AUTO: 0.06 K/UL — SIGNIFICANT CHANGE UP (ref 0–0.07)
IMM GRANULOCYTES NFR BLD AUTO: 0.7 % — SIGNIFICANT CHANGE UP (ref 0–0.9)
IMM GRANULOCYTES NFR BLD AUTO: 0.8 % — SIGNIFICANT CHANGE UP (ref 0–0.9)
LDH SERPL L TO P-CCNC: 280 U/L — HIGH (ref 135–225)
LDH SERPL L TO P-CCNC: 293 U/L — HIGH (ref 135–225)
LYMPHOCYTES # BLD AUTO: 1.2 K/UL — SIGNIFICANT CHANGE UP (ref 1–3.3)
LYMPHOCYTES # BLD AUTO: 1.41 K/UL — SIGNIFICANT CHANGE UP (ref 1–3.3)
LYMPHOCYTES NFR BLD AUTO: 16.5 % — SIGNIFICANT CHANGE UP (ref 13–44)
LYMPHOCYTES NFR BLD AUTO: 18.5 % — SIGNIFICANT CHANGE UP (ref 13–44)
MCHC RBC-ENTMCNC: 27 PG — SIGNIFICANT CHANGE UP (ref 27–34)
MCHC RBC-ENTMCNC: 27.2 PG — SIGNIFICANT CHANGE UP (ref 27–34)
MCHC RBC-ENTMCNC: 32.8 G/DL — SIGNIFICANT CHANGE UP (ref 32–36)
MCHC RBC-ENTMCNC: 33.3 G/DL — SIGNIFICANT CHANGE UP (ref 32–36)
MCV RBC AUTO: 81.5 FL — SIGNIFICANT CHANGE UP (ref 80–100)
MCV RBC AUTO: 82.5 FL — SIGNIFICANT CHANGE UP (ref 80–100)
MONOCYTES # BLD AUTO: 0.32 K/UL — SIGNIFICANT CHANGE UP (ref 0–0.9)
MONOCYTES # BLD AUTO: 0.49 K/UL — SIGNIFICANT CHANGE UP (ref 0–0.9)
MONOCYTES NFR BLD AUTO: 4.4 % — SIGNIFICANT CHANGE UP (ref 2–14)
MONOCYTES NFR BLD AUTO: 6.4 % — SIGNIFICANT CHANGE UP (ref 2–14)
NEUTROPHILS # BLD AUTO: 5.12 K/UL — SIGNIFICANT CHANGE UP (ref 1.8–7.4)
NEUTROPHILS # BLD AUTO: 5.24 K/UL — SIGNIFICANT CHANGE UP (ref 1.8–7.4)
NEUTROPHILS NFR BLD AUTO: 66.9 % — SIGNIFICANT CHANGE UP (ref 43–77)
NEUTROPHILS NFR BLD AUTO: 72.2 % — SIGNIFICANT CHANGE UP (ref 43–77)
NRBC # BLD AUTO: 0 K/UL — SIGNIFICANT CHANGE UP (ref 0–0)
NRBC # BLD AUTO: 0 K/UL — SIGNIFICANT CHANGE UP (ref 0–0)
NRBC # FLD: 0 K/UL — SIGNIFICANT CHANGE UP (ref 0–0)
NRBC # FLD: 0 K/UL — SIGNIFICANT CHANGE UP (ref 0–0)
NRBC BLD AUTO-RTO: 0 /100 WBCS — SIGNIFICANT CHANGE UP (ref 0–0)
NRBC BLD AUTO-RTO: 0 /100 WBCS — SIGNIFICANT CHANGE UP (ref 0–0)
PLATELET # BLD AUTO: 211 K/UL — SIGNIFICANT CHANGE UP (ref 150–400)
PLATELET # BLD AUTO: 223 K/UL — SIGNIFICANT CHANGE UP (ref 150–400)
PMV BLD: 11.6 FL — SIGNIFICANT CHANGE UP (ref 7–13)
PMV BLD: 12.1 FL — SIGNIFICANT CHANGE UP (ref 7–13)
POTASSIUM SERPL-MCNC: 3.9 MMOL/L — SIGNIFICANT CHANGE UP (ref 3.5–5.3)
POTASSIUM SERPL-MCNC: 4 MMOL/L — SIGNIFICANT CHANGE UP (ref 3.5–5.3)
POTASSIUM SERPL-SCNC: 3.9 MMOL/L — SIGNIFICANT CHANGE UP (ref 3.5–5.3)
POTASSIUM SERPL-SCNC: 4 MMOL/L — SIGNIFICANT CHANGE UP (ref 3.5–5.3)
PROT SERPL-MCNC: 6.9 G/DL — SIGNIFICANT CHANGE UP (ref 6–8.3)
PROT SERPL-MCNC: 7.3 G/DL — SIGNIFICANT CHANGE UP (ref 6–8.3)
RBC # BLD: 4.18 M/UL — SIGNIFICANT CHANGE UP (ref 3.8–5.2)
RBC # BLD: 4.49 M/UL — SIGNIFICANT CHANGE UP (ref 3.8–5.2)
RBC # FLD: 13.6 % — SIGNIFICANT CHANGE UP (ref 10.3–14.5)
RBC # FLD: 13.7 % — SIGNIFICANT CHANGE UP (ref 10.3–14.5)
SODIUM SERPL-SCNC: 138 MMOL/L — SIGNIFICANT CHANGE UP (ref 135–145)
SODIUM SERPL-SCNC: 138 MMOL/L — SIGNIFICANT CHANGE UP (ref 135–145)
URATE SERPL-MCNC: 6.6 MG/DL — SIGNIFICANT CHANGE UP (ref 2.5–7)
URATE SERPL-MCNC: 6.7 MG/DL — SIGNIFICANT CHANGE UP (ref 2.5–7)
WBC # BLD: 7.26 K/UL — SIGNIFICANT CHANGE UP (ref 3.8–10.5)
WBC # BLD: 7.64 K/UL — SIGNIFICANT CHANGE UP (ref 3.8–10.5)
WBC # FLD AUTO: 7.26 K/UL — SIGNIFICANT CHANGE UP (ref 3.8–10.5)
WBC # FLD AUTO: 7.64 K/UL — SIGNIFICANT CHANGE UP (ref 3.8–10.5)

## 2025-06-28 PROCEDURE — 76705 ECHO EXAM OF ABDOMEN: CPT | Mod: 26

## 2025-06-28 RX ORDER — NIFEDIPINE 30 MG
30 TABLET, EXTENDED RELEASE 24 HR ORAL ONCE
Refills: 0 | Status: COMPLETED | OUTPATIENT
Start: 2025-06-28 | End: 2025-06-28

## 2025-06-28 RX ORDER — NIFEDIPINE 30 MG
1 TABLET, EXTENDED RELEASE 24 HR ORAL
Qty: 30 | Refills: 0
Start: 2025-06-28 | End: 2025-07-27

## 2025-06-28 RX ORDER — LABETALOL HYDROCHLORIDE 200 MG/1
1 TABLET, FILM COATED ORAL
Qty: 90 | Refills: 0
Start: 2025-06-28 | End: 2025-07-27

## 2025-06-28 RX ORDER — LABETALOL HYDROCHLORIDE 200 MG/1
1 TABLET, FILM COATED ORAL
Refills: 0 | DISCHARGE

## 2025-06-28 RX ORDER — HEPARIN SODIUM 1000 [USP'U]/ML
5000 INJECTION INTRAVENOUS; SUBCUTANEOUS EVERY 8 HOURS
Refills: 0 | Status: DISCONTINUED | OUTPATIENT
Start: 2025-06-28 | End: 2025-06-28

## 2025-06-28 RX ORDER — NIFEDIPINE 30 MG
30 TABLET, EXTENDED RELEASE 24 HR ORAL DAILY
Refills: 0 | Status: DISCONTINUED | OUTPATIENT
Start: 2025-06-28 | End: 2025-06-28

## 2025-06-28 RX ORDER — NIFEDIPINE 30 MG
60 TABLET, EXTENDED RELEASE 24 HR ORAL DAILY
Refills: 0 | Status: DISCONTINUED | OUTPATIENT
Start: 2025-06-29 | End: 2025-06-28

## 2025-06-28 RX ADMIN — LABETALOL HYDROCHLORIDE 400 MILLIGRAM(S): 200 TABLET, FILM COATED ORAL at 15:11

## 2025-06-28 RX ADMIN — HEPARIN SODIUM 5000 UNIT(S): 1000 INJECTION INTRAVENOUS; SUBCUTANEOUS at 15:39

## 2025-06-28 RX ADMIN — Medication 30 MILLIGRAM(S): at 12:37

## 2025-06-28 RX ADMIN — LABETALOL HYDROCHLORIDE 400 MILLIGRAM(S): 200 TABLET, FILM COATED ORAL at 06:38

## 2025-06-28 RX ADMIN — HEPARIN SODIUM 5000 UNIT(S): 1000 INJECTION INTRAVENOUS; SUBCUTANEOUS at 06:38

## 2025-06-28 RX ADMIN — Medication 30 MILLIGRAM(S): at 06:38

## 2025-07-17 ENCOUNTER — APPOINTMENT (OUTPATIENT)
Dept: CARDIOLOGY | Facility: CLINIC | Age: 40
End: 2025-07-17
Payer: COMMERCIAL

## 2025-07-17 VITALS
OXYGEN SATURATION: 99 % | WEIGHT: 198 LBS | HEART RATE: 63 BPM | SYSTOLIC BLOOD PRESSURE: 148 MMHG | HEIGHT: 67 IN | DIASTOLIC BLOOD PRESSURE: 95 MMHG | BODY MASS INDEX: 31.08 KG/M2

## 2025-07-17 VITALS — DIASTOLIC BLOOD PRESSURE: 86 MMHG | SYSTOLIC BLOOD PRESSURE: 136 MMHG

## 2025-07-17 PROCEDURE — G2211 COMPLEX E/M VISIT ADD ON: CPT | Mod: NC

## 2025-07-17 PROCEDURE — 99214 OFFICE O/P EST MOD 30 MIN: CPT

## 2025-07-17 PROCEDURE — 93000 ELECTROCARDIOGRAM COMPLETE: CPT

## 2025-08-25 ENCOUNTER — APPOINTMENT (OUTPATIENT)
Dept: CARDIOLOGY | Facility: CLINIC | Age: 40
End: 2025-08-25
Payer: COMMERCIAL

## 2025-08-25 VITALS
SYSTOLIC BLOOD PRESSURE: 104 MMHG | RESPIRATION RATE: 16 BRPM | TEMPERATURE: 98.4 F | HEART RATE: 80 BPM | DIASTOLIC BLOOD PRESSURE: 70 MMHG | OXYGEN SATURATION: 96 %

## 2025-08-25 DIAGNOSIS — I10 ESSENTIAL (PRIMARY) HYPERTENSION: ICD-10-CM

## 2025-08-25 PROCEDURE — G2211 COMPLEX E/M VISIT ADD ON: CPT | Mod: NC

## 2025-08-25 PROCEDURE — 99214 OFFICE O/P EST MOD 30 MIN: CPT

## 2025-08-25 PROCEDURE — 93000 ELECTROCARDIOGRAM COMPLETE: CPT

## 2025-08-25 RX ORDER — NIFEDIPINE 30 MG/1
30 TABLET, FILM COATED, EXTENDED RELEASE ORAL DAILY
Qty: 1 | Refills: 3 | Status: ACTIVE | COMMUNITY
Start: 2025-08-25 | End: 1900-01-01

## (undated) DEVICE — BASIN SET DOUBLE

## (undated) DEVICE — TUBING SUCTION NONCONDUCTIVE 6MM X 12FT

## (undated) DEVICE — GLV 7.5 PROTEXIS (WHITE)

## (undated) DEVICE — DRSG TELFA 3 X 8

## (undated) DEVICE — LABELS BLANK W PEN

## (undated) DEVICE — GOWN LG

## (undated) DEVICE — VISITEC 4X4

## (undated) DEVICE — DRAPE TOWEL BLUE 17" X 24"

## (undated) DEVICE — VENODYNE/SCD SLEEVE CALF MEDIUM

## (undated) DEVICE — DRAPE LIGHT HANDLE COVER (GREEN)

## (undated) DEVICE — ELCTR BOVIE PENCIL BLADE 10FT

## (undated) DEVICE — DRSG PAD SANITARY OB

## (undated) DEVICE — SUT PROLENE 1 30" CT-1

## (undated) DEVICE — POSITIONER STRAP ARMBOARD VELCRO TS-30

## (undated) DEVICE — PACK D&C

## (undated) DEVICE — SOL IRR POUR H2O 500ML